# Patient Record
Sex: MALE | Race: WHITE | NOT HISPANIC OR LATINO | Employment: OTHER
[De-identification: names, ages, dates, MRNs, and addresses within clinical notes are randomized per-mention and may not be internally consistent; named-entity substitution may affect disease eponyms.]

---

## 2017-04-20 ENCOUNTER — PRIOR ORIGINAL RECORDS (OUTPATIENT)
Dept: OTHER | Age: 72
End: 2017-04-20

## 2017-04-20 LAB
ALBUMIN: 3.8 G/DL
ALKALINE PHOSPHATATE(ALK PHOS): 60 IU/L
BILIRUBIN TOTAL: 0.8 MG/DL
BUN: 14 MG/DL
CALCIUM: 8.9 MG/DL
CHLORIDE: 108 MEQ/L
CREATININE, SERUM: 0.9 MG/DL
FREE T4: 0.87 MG/DL
GLUCOSE: 117 MG/DL
HEMATOCRIT: 46.2 %
HEMOGLOBIN: 15.2 G/DL
PLATELETS: 177 K/UL
POTASSIUM, SERUM: 4.5 MEQ/L
PROTEIN, TOTAL: 7 G/DL
RED BLOOD COUNT: 5 X 10-6/U
SGOT (AST): 19 IU/L
SGPT (ALT): 20 IU/L
SODIUM: 142 MEQ/L
THYROID STIMULATING HORMONE: 2 MLU/L
VITAMIN D 25-OH: 27.9 NG/ML
WHITE BLOOD COUNT: 5.6 X 10-3/U

## 2017-05-01 LAB
ALT (SGPT): 20 U/L
AST (SGOT): 19 U/L
CHOLESTEROL, TOTAL: 99 MG/DL
GLUCOSE: 117 MG/DL
HDL CHOLESTEROL: 39 MG/DL
HEMOGLOBIN A1C: 6.2 %
LDL CHOLESTEROL: 42 MG/DL
THYROID STIMULATING HORMONE: 2 MLU/L
TRIGLYCERIDES: 92 MG/DL

## 2017-05-05 ENCOUNTER — PRIOR ORIGINAL RECORDS (OUTPATIENT)
Dept: OTHER | Age: 72
End: 2017-05-05

## 2017-06-19 ENCOUNTER — RT VISIT (OUTPATIENT)
Dept: RESPIRATORY THERAPY | Facility: HOSPITAL | Age: 72
End: 2017-06-19
Attending: INTERNAL MEDICINE
Payer: MEDICARE

## 2017-06-19 DIAGNOSIS — R05.9 COUGH: ICD-10-CM

## 2017-06-19 PROCEDURE — 94726 PLETHYSMOGRAPHY LUNG VOLUMES: CPT

## 2017-06-19 PROCEDURE — 94729 DIFFUSING CAPACITY: CPT

## 2017-06-19 PROCEDURE — 94060 EVALUATION OF WHEEZING: CPT

## 2017-06-20 NOTE — PROCEDURES
Spirometry and  flow volume loop are consistent with  mild  airway obstruction. Good respond to bronchodilators   ( The FEV1 improved by 280 ml after albuterol was given (a 12% improvement).    Normal TLC, no evidence of restriction  There is mild increa

## 2017-10-02 ENCOUNTER — HOSPITAL ENCOUNTER (OUTPATIENT)
Dept: CV DIAGNOSTICS | Facility: HOSPITAL | Age: 72
Discharge: HOME | End: 2017-10-02
Attending: INTERNAL MEDICINE
Payer: MEDICARE

## 2017-10-02 ENCOUNTER — HOSPITAL ENCOUNTER (OUTPATIENT)
Dept: LAB | Facility: HOSPITAL | Age: 72
Discharge: HOME | End: 2017-10-02
Attending: INTERNAL MEDICINE
Payer: MEDICARE

## 2017-10-02 ENCOUNTER — PRIOR ORIGINAL RECORDS (OUTPATIENT)
Dept: OTHER | Age: 72
End: 2017-10-02

## 2017-10-02 DIAGNOSIS — I25.10 CAD (CORONARY ARTERY DISEASE): ICD-10-CM

## 2017-10-02 PROCEDURE — 78452 HT MUSCLE IMAGE SPECT MULT: CPT | Performed by: INTERNAL MEDICINE

## 2017-10-02 PROCEDURE — 84460 ALANINE AMINO (ALT) (SGPT): CPT | Performed by: INTERNAL MEDICINE

## 2017-10-02 PROCEDURE — 84450 TRANSFERASE (AST) (SGOT): CPT | Performed by: INTERNAL MEDICINE

## 2017-10-02 PROCEDURE — 93017 CV STRESS TEST TRACING ONLY: CPT | Performed by: INTERNAL MEDICINE

## 2017-10-02 PROCEDURE — 80061 LIPID PANEL: CPT | Performed by: INTERNAL MEDICINE

## 2017-10-02 PROCEDURE — 83036 HEMOGLOBIN GLYCOSYLATED A1C: CPT | Performed by: INTERNAL MEDICINE

## 2017-10-02 PROCEDURE — 36415 COLL VENOUS BLD VENIPUNCTURE: CPT | Performed by: INTERNAL MEDICINE

## 2017-10-02 PROCEDURE — 93018 CV STRESS TEST I&R ONLY: CPT | Performed by: INTERNAL MEDICINE

## 2017-10-03 ENCOUNTER — PRIOR ORIGINAL RECORDS (OUTPATIENT)
Dept: OTHER | Age: 72
End: 2017-10-03

## 2017-10-03 LAB
ALT (SGPT): 22 U/L
AST (SGOT): 15 U/L
CHOLESTEROL, TOTAL: 103 MG/DL
HDL CHOLESTEROL: 53 MG/DL
HEMOGLOBIN A1C: 6.4 %
LDL CHOLESTEROL: 30 MG/DL
TRIGLYCERIDES: 98 MG/DL

## 2018-05-04 ENCOUNTER — MYAURORA ACCOUNT LINK (OUTPATIENT)
Dept: OTHER | Age: 73
End: 2018-05-04

## 2018-05-04 ENCOUNTER — PRIOR ORIGINAL RECORDS (OUTPATIENT)
Dept: OTHER | Age: 73
End: 2018-05-04

## 2018-05-21 ENCOUNTER — HOSPITAL ENCOUNTER (OUTPATIENT)
Dept: CARDIOLOGY CLINIC | Facility: HOSPITAL | Age: 73
Discharge: HOME | End: 2018-05-21
Attending: INTERNAL MEDICINE

## 2018-05-21 ENCOUNTER — MYAURORA ACCOUNT LINK (OUTPATIENT)
Dept: OTHER | Age: 73
End: 2018-05-21

## 2018-05-21 DIAGNOSIS — I73.9 CLAUDICATION (HCC): ICD-10-CM

## 2018-05-29 ENCOUNTER — PRIOR ORIGINAL RECORDS (OUTPATIENT)
Dept: OTHER | Age: 73
End: 2018-05-29

## 2018-06-01 ENCOUNTER — PRIOR ORIGINAL RECORDS (OUTPATIENT)
Dept: OTHER | Age: 73
End: 2018-06-01

## 2018-06-06 ENCOUNTER — PRIOR ORIGINAL RECORDS (OUTPATIENT)
Dept: OTHER | Age: 73
End: 2018-06-06

## 2018-11-02 ENCOUNTER — PRIOR ORIGINAL RECORDS (OUTPATIENT)
Dept: OTHER | Age: 73
End: 2018-11-02

## 2018-11-06 LAB
ALKALINE PHOSPHATATE(ALK PHOS): 74 IU/L
BILIRUBIN TOTAL: 0.7 MG/DL
BUN: 13 MG/DL
CALCIUM: 9.1 MG/DL
CHLORIDE: 105 MEQ/L
CHOLESTEROL, TOTAL: 109 MG/DL
CREATININE, SERUM: 0.74 MG/DL
GLUCOSE: 118 MG/DL
HDL CHOLESTEROL: 46 MG/DL
HEMOGLOBIN A1C: 6.7 %
LDL CHOLESTEROL: 45 MG/DL
POTASSIUM, SERUM: 4.7 MEQ/L
PROTEIN, TOTAL: 7.1 G/DL
SGOT (AST): 25 IU/L
SGPT (ALT): 24 IU/L
SODIUM: 142 MEQ/L
TRIGLYCERIDES: 87 MG/DL

## 2019-02-28 VITALS
BODY MASS INDEX: 32.14 KG/M2 | WEIGHT: 217 LBS | SYSTOLIC BLOOD PRESSURE: 112 MMHG | HEIGHT: 69 IN | DIASTOLIC BLOOD PRESSURE: 62 MMHG | HEART RATE: 96 BPM

## 2019-03-01 VITALS
HEART RATE: 72 BPM | WEIGHT: 224 LBS | BODY MASS INDEX: 33.18 KG/M2 | DIASTOLIC BLOOD PRESSURE: 68 MMHG | HEIGHT: 69 IN | SYSTOLIC BLOOD PRESSURE: 122 MMHG

## 2019-03-26 ENCOUNTER — TELEPHONE (OUTPATIENT)
Dept: CARDIOLOGY | Age: 74
End: 2019-03-26

## 2019-03-26 DIAGNOSIS — E78.00 HYPERCHOLESTEREMIA: Primary | ICD-10-CM

## 2019-03-26 RX ORDER — TAMSULOSIN HYDROCHLORIDE 0.4 MG/1
CAPSULE ORAL
COMMUNITY
Start: 2015-03-07

## 2019-03-26 RX ORDER — ACETAMINOPHEN 160 MG
TABLET,DISINTEGRATING ORAL
COMMUNITY
Start: 2018-05-04 | End: 2020-05-15 | Stop reason: ALTCHOICE

## 2019-03-26 RX ORDER — RAMIPRIL 5 MG/1
1 CAPSULE ORAL DAILY
COMMUNITY
Start: 2018-09-14 | End: 2019-11-05 | Stop reason: SDUPTHER

## 2019-03-26 RX ORDER — MELOXICAM 15 MG/1
1 TABLET ORAL DAILY
COMMUNITY
Start: 2018-05-04 | End: 2020-05-15 | Stop reason: ALTCHOICE

## 2019-03-26 RX ORDER — AMLODIPINE BESYLATE AND ATORVASTATIN CALCIUM 5; 20 MG/1; MG/1
1 TABLET, FILM COATED ORAL DAILY
COMMUNITY
Start: 2018-06-25 | End: 2019-09-03 | Stop reason: SDUPTHER

## 2019-03-26 RX ORDER — TIZANIDINE 2 MG/1
TABLET ORAL
COMMUNITY
End: 2020-05-15 | Stop reason: ALTCHOICE

## 2019-04-10 LAB
EST. AVERAGE GLUCOSE BLD GHB EST-MCNC: NORMAL MG/DL
HBA1C MFR BLD: 6.3 %
HBA1C MFR BLD: NORMAL % (ref 4.5–5.6)

## 2019-05-15 ENCOUNTER — OFFICE VISIT (OUTPATIENT)
Dept: CARDIOLOGY | Age: 74
End: 2019-05-15

## 2019-05-15 VITALS
HEART RATE: 84 BPM | BODY MASS INDEX: 32.14 KG/M2 | WEIGHT: 217 LBS | SYSTOLIC BLOOD PRESSURE: 116 MMHG | HEIGHT: 69 IN | DIASTOLIC BLOOD PRESSURE: 68 MMHG

## 2019-05-15 DIAGNOSIS — I73.9 PAD (PERIPHERAL ARTERY DISEASE) (CMD): ICD-10-CM

## 2019-05-15 DIAGNOSIS — E78.00 PURE HYPERCHOLESTEROLEMIA: ICD-10-CM

## 2019-05-15 DIAGNOSIS — I10 HYPERTENSION, BENIGN: Primary | ICD-10-CM

## 2019-05-15 PROCEDURE — 99214 OFFICE O/P EST MOD 30 MIN: CPT | Performed by: INTERNAL MEDICINE

## 2019-05-15 SDOH — HEALTH STABILITY: PHYSICAL HEALTH: ON AVERAGE, HOW MANY DAYS PER WEEK DO YOU ENGAGE IN MODERATE TO STRENUOUS EXERCISE (LIKE A BRISK WALK)?: 7 DAYS

## 2019-05-15 ASSESSMENT — ENCOUNTER SYMPTOMS
COUGH: 0
FEVER: 0
WEIGHT LOSS: 0
HEMOPTYSIS: 0
WEIGHT GAIN: 0
HEMATOCHEZIA: 0
BRUISES/BLEEDS EASILY: 0
SUSPICIOUS LESIONS: 0
CHILLS: 0
ALLERGIC/IMMUNOLOGIC COMMENTS: NO NEW FOOD ALLERGIES

## 2019-05-23 ENCOUNTER — CLINICAL ABSTRACT (OUTPATIENT)
Dept: CARDIOLOGY | Age: 74
End: 2019-05-23

## 2019-09-04 RX ORDER — AMLODIPINE BESYLATE AND ATORVASTATIN CALCIUM 5; 20 MG/1; MG/1
TABLET, FILM COATED ORAL
Qty: 90 TABLET | Refills: 2 | Status: SHIPPED | OUTPATIENT
Start: 2019-09-04 | End: 2020-04-14

## 2019-11-05 RX ORDER — RAMIPRIL 5 MG/1
CAPSULE ORAL
Qty: 90 CAPSULE | Refills: 3 | Status: SHIPPED | OUTPATIENT
Start: 2019-11-05 | End: 2019-12-16 | Stop reason: SDUPTHER

## 2019-11-05 RX ORDER — RAMIPRIL 5 MG/1
CAPSULE ORAL
Qty: 90 CAPSULE | Refills: 1 | OUTPATIENT
Start: 2019-11-05

## 2019-12-16 ENCOUNTER — TELEPHONE (OUTPATIENT)
Dept: CARDIOLOGY | Age: 74
End: 2019-12-16

## 2019-12-16 DIAGNOSIS — R07.2 PRECORDIAL PAIN: Primary | ICD-10-CM

## 2019-12-16 DIAGNOSIS — E78.00 PURE HYPERCHOLESTEROLEMIA: ICD-10-CM

## 2019-12-16 DIAGNOSIS — I73.9 PAD (PERIPHERAL ARTERY DISEASE) (CMD): ICD-10-CM

## 2019-12-16 DIAGNOSIS — I10 HYPERTENSION, BENIGN: ICD-10-CM

## 2019-12-16 RX ORDER — RAMIPRIL 5 MG/1
CAPSULE ORAL
Qty: 90 CAPSULE | Refills: 0 | Status: SHIPPED | OUTPATIENT
Start: 2019-12-16 | End: 2020-04-14

## 2020-03-24 ENCOUNTER — TELEPHONE (OUTPATIENT)
Dept: CARDIOLOGY | Age: 75
End: 2020-03-24

## 2020-03-24 DIAGNOSIS — I73.9 PAD (PERIPHERAL ARTERY DISEASE) (CMD): ICD-10-CM

## 2020-03-24 DIAGNOSIS — E78.00 PURE HYPERCHOLESTEROLEMIA: Primary | ICD-10-CM

## 2020-04-14 DIAGNOSIS — I10 HYPERTENSION, BENIGN: Primary | ICD-10-CM

## 2020-04-14 RX ORDER — AMLODIPINE BESYLATE AND ATORVASTATIN CALCIUM 5; 20 MG/1; MG/1
TABLET, FILM COATED ORAL
Qty: 90 TABLET | Refills: 3 | Status: SHIPPED | OUTPATIENT
Start: 2020-04-14 | End: 2021-04-15

## 2020-04-14 RX ORDER — RAMIPRIL 5 MG/1
CAPSULE ORAL
Qty: 90 CAPSULE | Refills: 3 | Status: SHIPPED | OUTPATIENT
Start: 2020-04-14 | End: 2021-01-14 | Stop reason: SDUPTHER

## 2020-04-29 LAB
ALBUMIN SERPL-MCNC: 4 G/DL
ALP SERPL-CCNC: 69 U/L
ALT SERPL-CCNC: 17 U/L
ANION GAP SERPL CALC-SCNC: 13 MMOL/L
APPEARANCE UR: CLEAR
AST SERPL-CCNC: 16 U/L
BASOPHILS # BLD: 0 10*3/UL
BASOPHILS NFR BLD: 0.3 %
BILIRUB SERPL-MCNC: 0.7 MG/DL
BILIRUB UR QL: NEGATIVE
BUN SERPL-MCNC: 13 MG/DL
CALCIUM SERPL-MCNC: 9.1 MG/DL
CHLORIDE SERPL-SCNC: 104 MMOL/L
CHOLEST SERPL-MCNC: 98 MG/DL
CO2 SERPL-SCNC: 24 MMOL/L
COLOR UR: YELLOW
CREAT SERPL-MCNC: 1 MG/DL
EOSINOPHIL # BLD: 0.1 10*3/UL
EOSINOPHIL NFR BLD: 1.9 %
ERYTHROCYTE [DISTWIDTH] IN BLOOD: 13.3 %
GLUCOSE SERPL-MCNC: 120 MG/DL
GLUCOSE UR-MCNC: NEGATIVE MG/DL
HBA1C MFR BLD: 6.3 %
HCT VFR BLD CALC: 46.6 %
HDLC SERPL-MCNC: 39 MG/DL
HGB BLD-MCNC: 16 G/DL
HGB UR QL: NORMAL
KETONES UR-MCNC: NEGATIVE MG/DL
LDLC SERPL CALC-MCNC: 31 MG/DL
LENGTH OF FAST TIME PATIENT: YES H
LENGTH OF FAST TIME PATIENT: YES H
LEUKOCYTE ESTERASE UR QL STRIP: NEGATIVE
LYMPHOCYTES # BLD: 2 10*3/UL
LYMPHOCYTES NFR BLD: 35.8 %
MCH RBC QN AUTO: 31.9 PG
MCHC RBC AUTO-ENTMCNC: 34.3 G/DL
MCV RBC AUTO: 93.1 FL
MONOCYTES # BLD: 0.7 10*3/UL
MONOCYTES NFR BLD: 12 %
NEUTROPHILS # BLD: 2.8 10*3/UL
NEUTROPHILS NFR BLD: 50 %
NITRITE UR QL: NEGATIVE
PH UR: 6 [PH]
PLATELET # BLD: 205 10*3/UL
POTASSIUM SERPL-SCNC: 4.6 MMOL/L
PROT SERPL-MCNC: 7.1 G/DL
PROT UR QL: NEGATIVE
PSA SERPL-MCNC: 1.5 NG/ML
RBC # BLD: 5.01 10*6/UL
SODIUM SERPL-SCNC: 141 MMOL/L
SP GR UR: 1.02
SPECIMEN SOURCE: NORMAL
TRIGL SERPL-MCNC: 142 MG/DL
TSH SERPL-ACNC: 1.85 M[IU]/L
UROBILINOGEN UR QL: <2
WBC # BLD: 5.6 10*3/UL

## 2020-05-01 ENCOUNTER — TELEPHONE (OUTPATIENT)
Dept: CARDIOLOGY | Age: 75
End: 2020-05-01

## 2020-05-01 ENCOUNTER — CLINICAL ABSTRACT (OUTPATIENT)
Dept: CARDIOLOGY | Age: 75
End: 2020-05-01

## 2020-05-15 ENCOUNTER — OFFICE VISIT (OUTPATIENT)
Dept: CARDIOLOGY | Age: 75
End: 2020-05-15

## 2020-05-15 VITALS
BODY MASS INDEX: 31.4 KG/M2 | DIASTOLIC BLOOD PRESSURE: 66 MMHG | HEART RATE: 80 BPM | WEIGHT: 212 LBS | HEIGHT: 69 IN | SYSTOLIC BLOOD PRESSURE: 123 MMHG

## 2020-05-15 DIAGNOSIS — E78.00 PURE HYPERCHOLESTEROLEMIA: ICD-10-CM

## 2020-05-15 DIAGNOSIS — I25.10 CORONARY ARTERY DISEASE INVOLVING NATIVE CORONARY ARTERY OF NATIVE HEART WITHOUT ANGINA PECTORIS: ICD-10-CM

## 2020-05-15 DIAGNOSIS — I10 HYPERTENSION, BENIGN: Primary | ICD-10-CM

## 2020-05-15 DIAGNOSIS — I73.9 PAD (PERIPHERAL ARTERY DISEASE) (CMD): ICD-10-CM

## 2020-05-15 PROCEDURE — 99442 TELEPHONE E&M BY PHYSICIAN EST PT NOT ORIG PREV 7 DAYS 11-20 MIN: CPT | Performed by: INTERNAL MEDICINE

## 2020-05-15 RX ORDER — CARVEDILOL 25 MG/1
TABLET, FILM COATED ORAL
COMMUNITY
Start: 2020-04-21

## 2020-05-15 RX ORDER — LANCETS
EACH MISCELLANEOUS
COMMUNITY
Start: 2020-03-25

## 2020-05-15 SDOH — HEALTH STABILITY: PHYSICAL HEALTH: ON AVERAGE, HOW MANY DAYS PER WEEK DO YOU ENGAGE IN MODERATE TO STRENUOUS EXERCISE (LIKE A BRISK WALK)?: 0 DAYS

## 2020-05-15 SDOH — SOCIAL STABILITY: SOCIAL NETWORK: ARE YOU MARRIED, WIDOWED, DIVORCED, SEPARATED, NEVER MARRIED, OR LIVING WITH A PARTNER?: MARRIED

## 2020-05-15 SDOH — HEALTH STABILITY: PHYSICAL HEALTH: ON AVERAGE, HOW MANY MINUTES DO YOU ENGAGE IN EXERCISE AT THIS LEVEL?: 0 MIN

## 2020-05-15 ASSESSMENT — PATIENT HEALTH QUESTIONNAIRE - PHQ9
1. LITTLE INTEREST OR PLEASURE IN DOING THINGS: NOT AT ALL
SUM OF ALL RESPONSES TO PHQ9 QUESTIONS 1 AND 2: 0
SUM OF ALL RESPONSES TO PHQ9 QUESTIONS 1 AND 2: 0
2. FEELING DOWN, DEPRESSED OR HOPELESS: NOT AT ALL

## 2021-01-14 ENCOUNTER — TELEPHONE (OUTPATIENT)
Dept: CARDIOLOGY | Age: 76
End: 2021-01-14

## 2021-01-14 DIAGNOSIS — I10 HYPERTENSION, BENIGN: ICD-10-CM

## 2021-01-14 RX ORDER — RAMIPRIL 5 MG/1
5 CAPSULE ORAL DAILY
Qty: 90 CAPSULE | Refills: 3 | Status: SHIPPED | OUTPATIENT
Start: 2021-01-14

## 2021-04-08 ENCOUNTER — TELEPHONE (OUTPATIENT)
Dept: CARDIOLOGY | Age: 76
End: 2021-04-08

## 2021-04-08 DIAGNOSIS — I25.10 CORONARY ARTERY DISEASE INVOLVING NATIVE CORONARY ARTERY OF NATIVE HEART WITHOUT ANGINA PECTORIS: Primary | ICD-10-CM

## 2021-04-08 DIAGNOSIS — E78.00 PURE HYPERCHOLESTEROLEMIA: ICD-10-CM

## 2021-04-14 DIAGNOSIS — I10 HYPERTENSION, BENIGN: ICD-10-CM

## 2021-04-15 RX ORDER — AMLODIPINE BESYLATE AND ATORVASTATIN CALCIUM 5; 20 MG/1; MG/1
TABLET, FILM COATED ORAL
Qty: 90 TABLET | Refills: 0 | Status: SHIPPED | OUTPATIENT
Start: 2021-04-15

## 2021-05-07 ENCOUNTER — OFFICE VISIT (OUTPATIENT)
Dept: CARDIOLOGY | Age: 76
End: 2021-05-07

## 2021-05-07 VITALS
SYSTOLIC BLOOD PRESSURE: 120 MMHG | DIASTOLIC BLOOD PRESSURE: 62 MMHG | HEART RATE: 88 BPM | HEIGHT: 69 IN | WEIGHT: 215 LBS | BODY MASS INDEX: 31.84 KG/M2

## 2021-05-07 DIAGNOSIS — E78.00 PURE HYPERCHOLESTEROLEMIA: ICD-10-CM

## 2021-05-07 DIAGNOSIS — I73.9 PAD (PERIPHERAL ARTERY DISEASE) (CMD): Primary | ICD-10-CM

## 2021-05-07 DIAGNOSIS — I25.10 CORONARY ARTERY DISEASE INVOLVING NATIVE CORONARY ARTERY OF NATIVE HEART WITHOUT ANGINA PECTORIS: ICD-10-CM

## 2021-05-07 DIAGNOSIS — I10 HYPERTENSION, BENIGN: ICD-10-CM

## 2021-05-07 PROCEDURE — 99214 OFFICE O/P EST MOD 30 MIN: CPT | Performed by: INTERNAL MEDICINE

## 2021-05-07 RX ORDER — BACLOFEN 10 MG/1
TABLET ORAL
COMMUNITY
Start: 2021-01-13

## 2021-05-07 ASSESSMENT — ENCOUNTER SYMPTOMS
COUGH: 0
WEIGHT LOSS: 0
BRUISES/BLEEDS EASILY: 0
ALLERGIC/IMMUNOLOGIC COMMENTS: NO NEW FOOD ALLERGIES
WEIGHT GAIN: 0
SUSPICIOUS LESIONS: 0
HEMATOCHEZIA: 0
FEVER: 0
HEMOPTYSIS: 0
CHILLS: 0

## 2021-05-07 ASSESSMENT — PATIENT HEALTH QUESTIONNAIRE - PHQ9
1. LITTLE INTEREST OR PLEASURE IN DOING THINGS: NOT AT ALL
CLINICAL INTERPRETATION OF PHQ2 SCORE: NO FURTHER SCREENING NEEDED
CLINICAL INTERPRETATION OF PHQ9 SCORE: NO FURTHER SCREENING NEEDED
SUM OF ALL RESPONSES TO PHQ9 QUESTIONS 1 AND 2: 0
SUM OF ALL RESPONSES TO PHQ9 QUESTIONS 1 AND 2: 0
2. FEELING DOWN, DEPRESSED OR HOPELESS: NOT AT ALL

## 2025-04-25 ENCOUNTER — TELEPHONE (OUTPATIENT)
Dept: FAMILY MEDICINE CLINIC | Facility: CLINIC | Age: 80
End: 2025-04-25

## 2025-04-25 NOTE — TELEPHONE ENCOUNTER
MAREK on 05/09/25 with Dr. Behery @ [facility]    H&P- completed 04/29/25  Labs- FBS (116), A1C (5.9), MRSA neg, all other labs WNL  EKG- WNL  X-ray- no cardiopulmonary process    Cardiology- Dr. Paras Mckinney  P- [phone]  F- [phone]  Last seen: 04/23/25  Patient doing well overall. He has a history of PAD but no major CAD. He does not have angina. He has relatively good exercise tolerance despite his knee. EKG is normal. Given this, I think his risks can be acceptable although elevated given his comorbidities.As it has been quite a few years since his last stress test, I did offer him a Lexiscan. Patient feels he is doing okay and does not want to do 1.I did order an echo with Dopplers to make sure his LV function is normal. If it is indeed normal, he should be at acceptable risk.    Cardiac Clx scanned into Media 05/01/25:  \"Acceptable cardiac risk for knee surgery. Normal EF with no wall motion abnormalities or valve disease.\"    Echo scanned into Media 05/01/25

## 2025-04-29 ENCOUNTER — OFFICE VISIT (OUTPATIENT)
Dept: FAMILY MEDICINE CLINIC | Facility: CLINIC | Age: 80
End: 2025-04-29
Payer: MEDICARE

## 2025-04-29 ENCOUNTER — LAB ENCOUNTER (OUTPATIENT)
Dept: LAB | Facility: HOSPITAL | Age: 80
End: 2025-04-29
Attending: FAMILY MEDICINE
Payer: MEDICARE

## 2025-04-29 ENCOUNTER — HOSPITAL ENCOUNTER (OUTPATIENT)
Dept: GENERAL RADIOLOGY | Facility: HOSPITAL | Age: 80
Discharge: HOME | End: 2025-04-29
Attending: FAMILY MEDICINE
Payer: MEDICARE

## 2025-04-29 VITALS
HEART RATE: 90 BPM | BODY MASS INDEX: 31.71 KG/M2 | RESPIRATION RATE: 16 BRPM | OXYGEN SATURATION: 93 % | DIASTOLIC BLOOD PRESSURE: 70 MMHG | WEIGHT: 202 LBS | SYSTOLIC BLOOD PRESSURE: 116 MMHG | TEMPERATURE: 98 F | HEIGHT: 67 IN

## 2025-04-29 DIAGNOSIS — Z86.0100 HISTORY OF COLONIC POLYPS: ICD-10-CM

## 2025-04-29 DIAGNOSIS — R06.02 SHORTNESS OF BREATH: ICD-10-CM

## 2025-04-29 DIAGNOSIS — N40.1 BENIGN PROSTATIC HYPERPLASIA WITH NOCTURIA: ICD-10-CM

## 2025-04-29 DIAGNOSIS — E11.9 TYPE 2 DIABETES MELLITUS WITHOUT COMPLICATION, WITHOUT LONG-TERM CURRENT USE OF INSULIN (HCC): ICD-10-CM

## 2025-04-29 DIAGNOSIS — Z01.812 PRE-OPERATIVE LABORATORY EXAMINATION: ICD-10-CM

## 2025-04-29 DIAGNOSIS — I25.10 ATHEROSCLEROSIS OF NATIVE CORONARY ARTERY OF NATIVE HEART WITHOUT ANGINA PECTORIS: ICD-10-CM

## 2025-04-29 DIAGNOSIS — Z01.818 PREOP EXAMINATION: ICD-10-CM

## 2025-04-29 DIAGNOSIS — I10 PRIMARY HYPERTENSION: ICD-10-CM

## 2025-04-29 DIAGNOSIS — Z85.828 HISTORY OF SKIN CANCER IN ADULTHOOD: ICD-10-CM

## 2025-04-29 DIAGNOSIS — R35.1 BENIGN PROSTATIC HYPERPLASIA WITH NOCTURIA: ICD-10-CM

## 2025-04-29 DIAGNOSIS — M17.12 PRIMARY OSTEOARTHRITIS OF LEFT KNEE: Primary | ICD-10-CM

## 2025-04-29 DIAGNOSIS — M47.16 LUMBAR SPONDYLOSIS WITH MYELOPATHY: ICD-10-CM

## 2025-04-29 DIAGNOSIS — M15.0 PRIMARY OSTEOARTHRITIS INVOLVING MULTIPLE JOINTS: ICD-10-CM

## 2025-04-29 DIAGNOSIS — K59.01 SLOW TRANSIT CONSTIPATION: ICD-10-CM

## 2025-04-29 DIAGNOSIS — E55.9 VITAMIN D DEFICIENCY: ICD-10-CM

## 2025-04-29 DIAGNOSIS — E78.2 MIXED HYPERLIPIDEMIA: ICD-10-CM

## 2025-04-29 LAB
ALBUMIN SERPL-MCNC: 4.4 G/DL (ref 3.2–4.8)
ALBUMIN/GLOB SERPL: 1.8 {RATIO} (ref 1–2)
ALP LIVER SERPL-CCNC: 75 U/L (ref 45–117)
ALT SERPL-CCNC: 14 U/L (ref 10–49)
ANION GAP SERPL CALC-SCNC: 6 MMOL/L (ref 0–18)
APTT PPP: 28.8 SECONDS (ref 23–36)
AST SERPL-CCNC: 18 U/L (ref ?–34)
ATRIAL RATE: 79 BPM
BASOPHILS # BLD AUTO: 0.03 X10(3) UL (ref 0–0.2)
BASOPHILS NFR BLD AUTO: 0.6 %
BILIRUB SERPL-MCNC: 0.8 MG/DL (ref 0.2–1.1)
BILIRUB UR QL: NEGATIVE
BUN BLD-MCNC: 16 MG/DL (ref 9–23)
BUN/CREAT SERPL: 16.5 (ref 10–20)
CALCIUM BLD-MCNC: 8.9 MG/DL (ref 8.7–10.4)
CHLORIDE SERPL-SCNC: 104 MMOL/L (ref 98–112)
CLARITY UR: CLEAR
CO2 SERPL-SCNC: 29 MMOL/L (ref 21–32)
CREAT BLD-MCNC: 0.97 MG/DL (ref 0.7–1.3)
DEPRECATED RDW RBC AUTO: 45.4 FL (ref 35.1–46.3)
EGFRCR SERPLBLD CKD-EPI 2021: 78 ML/MIN/1.73M2 (ref 60–?)
EOSINOPHIL # BLD AUTO: 0.14 X10(3) UL (ref 0–0.7)
EOSINOPHIL NFR BLD AUTO: 2.8 %
ERYTHROCYTE [DISTWIDTH] IN BLOOD BY AUTOMATED COUNT: 12.6 % (ref 11–15)
EST. AVERAGE GLUCOSE BLD GHB EST-MCNC: 123 MG/DL (ref 68–126)
FASTING STATUS PATIENT QL REPORTED: YES
GLOBULIN PLAS-MCNC: 2.5 G/DL (ref 2–3.5)
GLUCOSE BLD-MCNC: 116 MG/DL (ref 70–99)
GLUCOSE UR-MCNC: NORMAL MG/DL
HBA1C MFR BLD: 5.9 % (ref ?–5.7)
HCT VFR BLD AUTO: 45.3 % (ref 39–53)
HGB BLD-MCNC: 15.1 G/DL (ref 13–17.5)
HGB UR QL STRIP.AUTO: NEGATIVE
IMM GRANULOCYTES # BLD AUTO: 0.01 X10(3) UL (ref 0–1)
IMM GRANULOCYTES NFR BLD: 0.2 %
INR BLD: 0.96 (ref 0.8–1.2)
KETONES UR-MCNC: NEGATIVE MG/DL
LEUKOCYTE ESTERASE UR QL STRIP.AUTO: NEGATIVE
LYMPHOCYTES # BLD AUTO: 1.88 X10(3) UL (ref 1–4)
LYMPHOCYTES NFR BLD AUTO: 37.5 %
MCH RBC QN AUTO: 31.9 PG (ref 26–34)
MCHC RBC AUTO-ENTMCNC: 33.3 G/DL (ref 31–37)
MCV RBC AUTO: 95.8 FL (ref 80–100)
MONOCYTES # BLD AUTO: 0.55 X10(3) UL (ref 0.1–1)
MONOCYTES NFR BLD AUTO: 11 %
NEUTROPHILS # BLD AUTO: 2.4 X10 (3) UL (ref 1.5–7.7)
NEUTROPHILS # BLD AUTO: 2.4 X10(3) UL (ref 1.5–7.7)
NEUTROPHILS NFR BLD AUTO: 47.9 %
NITRITE UR QL STRIP.AUTO: NEGATIVE
OSMOLALITY SERPL CALC.SUM OF ELEC: 290 MOSM/KG (ref 275–295)
P AXIS: 67 DEGREES
P-R INTERVAL: 190 MS
PH UR: 5 [PH] (ref 5–8)
PLATELET # BLD AUTO: 187 10(3)UL (ref 150–450)
POTASSIUM SERPL-SCNC: 4.4 MMOL/L (ref 3.5–5.1)
PROT SERPL-MCNC: 6.9 G/DL (ref 5.7–8.2)
PROT UR-MCNC: NEGATIVE MG/DL
PROTHROMBIN TIME: 13.4 SECONDS (ref 11.6–14.8)
Q-T INTERVAL: 376 MS
QRS DURATION: 80 MS
QTC CALCULATION (BEZET): 431 MS
R AXIS: 14 DEGREES
RBC # BLD AUTO: 4.73 X10(6)UL (ref 3.8–5.8)
SODIUM SERPL-SCNC: 139 MMOL/L (ref 136–145)
SP GR UR STRIP: 1.02 (ref 1–1.03)
T AXIS: 55 DEGREES
UROBILINOGEN UR STRIP-ACNC: NORMAL
VENTRICULAR RATE: 79 BPM
WBC # BLD AUTO: 5 X10(3) UL (ref 4–11)

## 2025-04-29 PROCEDURE — 85730 THROMBOPLASTIN TIME PARTIAL: CPT

## 2025-04-29 PROCEDURE — 36415 COLL VENOUS BLD VENIPUNCTURE: CPT

## 2025-04-29 PROCEDURE — 87081 CULTURE SCREEN ONLY: CPT

## 2025-04-29 PROCEDURE — 85025 COMPLETE CBC W/AUTO DIFF WBC: CPT

## 2025-04-29 PROCEDURE — 93010 ELECTROCARDIOGRAM REPORT: CPT | Performed by: STUDENT IN AN ORGANIZED HEALTH CARE EDUCATION/TRAINING PROGRAM

## 2025-04-29 PROCEDURE — 80053 COMPREHEN METABOLIC PANEL: CPT

## 2025-04-29 PROCEDURE — 71046 X-RAY EXAM CHEST 2 VIEWS: CPT | Performed by: FAMILY MEDICINE

## 2025-04-29 PROCEDURE — 83036 HEMOGLOBIN GLYCOSYLATED A1C: CPT

## 2025-04-29 PROCEDURE — 81003 URINALYSIS AUTO W/O SCOPE: CPT

## 2025-04-29 PROCEDURE — 99204 OFFICE O/P NEW MOD 45 MIN: CPT | Performed by: FAMILY MEDICINE

## 2025-04-29 PROCEDURE — 99499 UNLISTED E&M SERVICE: CPT | Performed by: FAMILY MEDICINE

## 2025-04-29 PROCEDURE — 93005 ELECTROCARDIOGRAM TRACING: CPT

## 2025-04-29 PROCEDURE — 85610 PROTHROMBIN TIME: CPT

## 2025-04-29 RX ORDER — ATORVASTATIN CALCIUM 20 MG/1
20 TABLET, FILM COATED ORAL DAILY
COMMUNITY

## 2025-04-29 RX ORDER — POLYETHYLENE GLYCOL 3350 17 G/17G
17 POWDER, FOR SOLUTION ORAL DAILY
COMMUNITY

## 2025-04-29 RX ORDER — TAMSULOSIN HYDROCHLORIDE 0.4 MG/1
0.4 CAPSULE ORAL DAILY
COMMUNITY

## 2025-04-29 RX ORDER — MULTIVITAMIN
1 TABLET ORAL DAILY
COMMUNITY

## 2025-04-29 RX ORDER — RAMIPRIL 10 MG/1
10 CAPSULE ORAL DAILY
COMMUNITY

## 2025-04-29 NOTE — H&P
[facility] PRE-OP CLINIC [place]    PRE-OP NOTE    HPI:   I have been consulted by Dr. Behery to see Fransisco David 80 year old male for a preoperative evaluation and medical clearance. He has a long history of worsening severe degenerative arthritis of his left knee . Patient is to have a left total knee arthroplasty  by Dr. Behery  on 2025.     Pt suffers significant pain and loss of function.     He has no cardiopulmonary symptoms.      He has no history of LINDY or DVT. Denies tobacco use.       Family History[1]   Current Medications[2]  Past Medical History[3]  Past Surgical History[4]  Social History     Socioeconomic History    Marital status:      Spouse name: Not on file    Number of children: Not on file    Years of education: Not on file    Highest education level: Not on file   Occupational History    Not on file   Tobacco Use    Smoking status: Former     Types: Cigarettes     Start date:      Quit date:      Years since quittin.3     Passive exposure: Past    Smokeless tobacco: Never   Vaping Use    Vaping status: Never Used   Substance and Sexual Activity    Alcohol use: Yes     Comment: socailly    Drug use: Never    Sexual activity: Not on file   Other Topics Concern    Not on file   Social History Narrative    Not on file     Social Drivers of Health     Food Insecurity: Not on file   Transportation Needs: Not on file   Stress: Not on file   Housing Stability: Not on file       REVIEW OF SYSTEMS:   CONSTITUTIONAL:  Denies unusual weight gain/loss, fever, chills  EENT:  Eyes:  Denies eye pain, visual loss, blurred vision, double vision. Ears, Nose, Throat:  Denies congestion, runny nose or sore throat.  INTEGUMENTARY:  Denies rashes, itching, skin lesion,   CARDIOVASCULAR:  Denies DVT. Denies chest pain, palpitations, edema, dyspnea  RESPIRATORY: Denies  LINDY ,Denies shortness of breath, wheezing, cough  GASTROINTESTINAL:  Denies abdominal pain, nausea, vomiting,  constipation, diarrhea, or blood in stool.  MUSCULOSKELETAL: severe pain to his left knee as noted above  NEUROLOGICAL:  Denies headache, seizures, dizziness, syncope, paralysis, ataxia,  HEMATOLOGIC:  Denies anemia, bleeding or bruising.  LYMPHATICS:  Denies enlarged nodes   PSYCHIATRIC:  Denies depression or anxiety.  ENDOCRINOLOGIC: DM 2 YES,   ALLERGIES:  Denies allergic response, history of asthma, hives,     EXAM:   /70 (BP Location: Left arm)   Pulse 90   Temp 98 °F (36.7 °C) (Temporal)   Resp 16   Ht 5' 7\" (1.702 m)   Wt 202 lb (91.6 kg)   SpO2 93%   BMI 31.64 kg/m²  Estimated body mass index is 31.64 kg/m² as calculated from the following:    Height as of this encounter: 5' 7\" (1.702 m).    Weight as of this encounter: 202 lb (91.6 kg).   Vital signs reviewed.Appears stated age, well groomed.  Physical Exam:  GEN:  Patient is alert, awake and oriented, well developed, well nourished, no apparent distress.  HEENT:  Head:  Normocephalic, atraumatic  Nose: patent, no nasal discharge  NECK: Supple, no CLAD, no carotid bruit, no thyromegaly.  SKIN: No rashes, no skin lesion, no bruising, good turgor.  HEART:  Regular rate and rhythm, no murmurs, rubs or gallops.  LUNGS: Clear to auscultation bilterally, no rales/rhonchi/wheezing.  CHEST: No tenderness.  ABDOMEN:  Soft, nondistended, nontender,  no masses, no hepatosplenomegaly.  BACK: No tenderness, no spasm,   EXTREMITIES:  hypertrophic changes to his left knee ,   NEURO:  No focal deficit, speech fluent, he walks with an antalgic  gait, strength and tone, sensory intact      Lab Results   Component Value Date    AST 15 10/02/2017    ALT 22 10/02/2017       No results found.          ASSESSMENT AND PLAN:   Fransisco David is a 80 year old male, with a hx of severe degenerative arthritis of his left knee  who presents for a pre-operative physical exam. Patient is to have a left total knee arthroplasty  by Dr. Behery  on 5/9/2025.      ICD-10-CM     1. Primary osteoarthritis of left knee  M17.12       2. Primary hypertension  I10       3. Mixed hyperlipidemia  E78.2       4. Slow transit constipation  K59.01       5. Benign prostatic hyperplasia with nocturia  N40.1     R35.1       6. Vitamin D deficiency  E55.9       7. Atherosclerosis of native coronary artery of native heart without angina pectoris  I25.10       8. History of colonic polyps  Z86.0100       9. Primary osteoarthritis involving multiple joints  M15.0     S/P right hip and right knee arthroplasty      10. History of skin cancer in adulthood  Z85.828       11. Lumbar spondylosis with myelopathy S/P multilevel laminectomy and discectomy  M47.16       12. BMI 31.0-31.9,adult  Z68.31       13. Type 2 diabetes mellitus without complication, without long-term current use of insulin (HCC)  E11.9 CBC With Differential With Platelet     Comp Metabolic Panel (14)     EKG 12 Lead     Hemoglobin A1C     MSSA and MRSA Culture Screen     XR CHEST PA + LAT CHEST (CPT=71046)     Urinalysis with Culture Reflex     PTT, Activated     Prothrombin Time (PT)      14. Shortness of breath  R06.02 CBC With Differential With Platelet     Comp Metabolic Panel (14)     EKG 12 Lead     Hemoglobin A1C     MSSA and MRSA Culture Screen     XR CHEST PA + LAT CHEST (CPT=71046)     Urinalysis with Culture Reflex     PTT, Activated     Prothrombin Time (PT)      15. Pre-operative laboratory examination  Z01.812 CBC With Differential With Platelet     Comp Metabolic Panel (14)     EKG 12 Lead     Hemoglobin A1C     MSSA and MRSA Culture Screen     XR CHEST PA + LAT CHEST (CPT=71046)     Urinalysis with Culture Reflex     PTT, Activated     Prothrombin Time (PT)      16. Preop examination  Z01.818 CBC With Differential With Platelet     Comp Metabolic Panel (14)     EKG 12 Lead     Hemoglobin A1C     MSSA and MRSA Culture Screen     XR CHEST PA + LAT CHEST (CPT=71046)     Urinalysis with Culture Reflex     PTT, Activated      Prothrombin Time (PT)        Problem List[5]     ECG and labs are pending review     Preoperative Risk Stratification: There are no decompensated medical conditions. ASA classification 2    Medical history:  High risk surgery (vascular, thoracic, intra-peritoneal): No  CAD: Yes  CHF: No  Stroke: No  DM on insulin: No  Serum Creatinine >2 mg/dl: No  Patient has an RCRI score that is intermediate risk and is at intermediate risk for major cardiac event in the perioperative period.     Patient is medically optimized and has an acceptable risk of surgery and may proceed with surgery as planned.     PLAN:    Patient to discontinue medications and supplements with anticoagulation properties as per instruction.        Postoperative Recommendations:    Anticoagulation / DVT prophylaxis: SCDs, early ambulation. ASA 81 mg twice daily with food for four weeks.    GI protection: Protonix  Incentive Spirometry   Telemetry as needed  CPAP/O2 as needed   DM: QID glucoscans and sliding scale insulin as needed   Renal protection: (hydration / NSAID and ACE/ARB avoidance)   Cognitive protection: (minimize narcotics, benzodiazepines, scopolamine)     Pain management and Physical therapy as per Orthopedic service.   Home Health as needed    Thank you for the opportunity to care for your patient and to assist in managing the postoperative course.        Johan Reid,   4/29/2025  9:26 AM         [1]   Family History  Problem Relation Age of Onset    Other (Other) Father         cirrohsis    Cancer Mother         liver    Other (Other) Brother         MI   [2]   Current Outpatient Medications   Medication Sig Dispense Refill    atorvastatin 20 MG Oral Tab Take 1 tablet (20 mg total) by mouth daily.      ramipril 10 MG Oral Cap Take 1 capsule (10 mg total) by mouth daily.      metFORMIN 500 MG Oral Tab Take 1 tablet (500 mg total) by mouth 2 (two) times daily with meals.      tamsulosin 0.4 MG Oral Cap Take 1 capsule (0.4 mg total) by  mouth daily.      polyethylene glycol, PEG 3350, 17 g Oral Powd Pack Take 17 g by mouth daily.      Multiple Vitamin (ONE-DAILY MULTI VITAMINS) Oral Tab Take 1 tablet by mouth daily.      Cholecalciferol (VITAMIN D3 OR) Take 1,000 Units by mouth daily.     [3]   Past Medical History:   CAD (coronary artery disease)    Cancer (HCC)    skin    Diabetes type 2 (HCC)    History of colon polyps    HTN (hypertension)    Hyperlipidemia    Osteoarthritis    Visual impairment    reading   [4]   Past Surgical History:  Procedure Laterality Date    Anesth,lower arm surgery Right     wrist surgery    Cataract Bilateral     Hip replacement surgery Right 2007    Skin surgery      skin cancer removed from face and chest    Total knee replacement Right 2014   [5] There is no problem list on file for this patient.

## 2025-04-29 NOTE — H&P (VIEW-ONLY)
[facility] PRE-OP CLINIC [place]    PRE-OP NOTE    HPI:   I have been consulted by Dr. Behery to see Fransisco David 80 year old male for a preoperative evaluation and medical clearance. He has a long history of worsening severe degenerative arthritis of his left knee . Patient is to have a left total knee arthroplasty  by Dr. Behery  on 2025.     Pt suffers significant pain and loss of function.     He has no cardiopulmonary symptoms.      He has no history of LINDY or DVT. Denies tobacco use.       Family History[1]   Current Medications[2]  Past Medical History[3]  Past Surgical History[4]  Social History     Socioeconomic History    Marital status:      Spouse name: Not on file    Number of children: Not on file    Years of education: Not on file    Highest education level: Not on file   Occupational History    Not on file   Tobacco Use    Smoking status: Former     Types: Cigarettes     Start date:      Quit date:      Years since quittin.3     Passive exposure: Past    Smokeless tobacco: Never   Vaping Use    Vaping status: Never Used   Substance and Sexual Activity    Alcohol use: Yes     Comment: socailly    Drug use: Never    Sexual activity: Not on file   Other Topics Concern    Not on file   Social History Narrative    Not on file     Social Drivers of Health     Food Insecurity: Not on file   Transportation Needs: Not on file   Stress: Not on file   Housing Stability: Not on file       REVIEW OF SYSTEMS:   CONSTITUTIONAL:  Denies unusual weight gain/loss, fever, chills  EENT:  Eyes:  Denies eye pain, visual loss, blurred vision, double vision. Ears, Nose, Throat:  Denies congestion, runny nose or sore throat.  INTEGUMENTARY:  Denies rashes, itching, skin lesion,   CARDIOVASCULAR:  Denies DVT. Denies chest pain, palpitations, edema, dyspnea  RESPIRATORY: Denies  LINDY ,Denies shortness of breath, wheezing, cough  GASTROINTESTINAL:  Denies abdominal pain, nausea, vomiting,  constipation, diarrhea, or blood in stool.  MUSCULOSKELETAL: severe pain to his left knee as noted above  NEUROLOGICAL:  Denies headache, seizures, dizziness, syncope, paralysis, ataxia,  HEMATOLOGIC:  Denies anemia, bleeding or bruising.  LYMPHATICS:  Denies enlarged nodes   PSYCHIATRIC:  Denies depression or anxiety.  ENDOCRINOLOGIC: DM 2 YES,   ALLERGIES:  Denies allergic response, history of asthma, hives,     EXAM:   /70 (BP Location: Left arm)   Pulse 90   Temp 98 °F (36.7 °C) (Temporal)   Resp 16   Ht 5' 7\" (1.702 m)   Wt 202 lb (91.6 kg)   SpO2 93%   BMI 31.64 kg/m²  Estimated body mass index is 31.64 kg/m² as calculated from the following:    Height as of this encounter: 5' 7\" (1.702 m).    Weight as of this encounter: 202 lb (91.6 kg).   Vital signs reviewed.Appears stated age, well groomed.  Physical Exam:  GEN:  Patient is alert, awake and oriented, well developed, well nourished, no apparent distress.  HEENT:  Head:  Normocephalic, atraumatic  Nose: patent, no nasal discharge  NECK: Supple, no CLAD, no carotid bruit, no thyromegaly.  SKIN: No rashes, no skin lesion, no bruising, good turgor.  HEART:  Regular rate and rhythm, no murmurs, rubs or gallops.  LUNGS: Clear to auscultation bilterally, no rales/rhonchi/wheezing.  CHEST: No tenderness.  ABDOMEN:  Soft, nondistended, nontender,  no masses, no hepatosplenomegaly.  BACK: No tenderness, no spasm,   EXTREMITIES:  hypertrophic changes to his left knee ,   NEURO:  No focal deficit, speech fluent, he walks with an antalgic  gait, strength and tone, sensory intact      Lab Results   Component Value Date    AST 15 10/02/2017    ALT 22 10/02/2017       No results found.          ASSESSMENT AND PLAN:   Fransisco David is a 80 year old male, with a hx of severe degenerative arthritis of his left knee  who presents for a pre-operative physical exam. Patient is to have a left total knee arthroplasty  by Dr. Behery  on 5/9/2025.      ICD-10-CM     1. Primary osteoarthritis of left knee  M17.12       2. Primary hypertension  I10       3. Mixed hyperlipidemia  E78.2       4. Slow transit constipation  K59.01       5. Benign prostatic hyperplasia with nocturia  N40.1     R35.1       6. Vitamin D deficiency  E55.9       7. Atherosclerosis of native coronary artery of native heart without angina pectoris  I25.10       8. History of colonic polyps  Z86.0100       9. Primary osteoarthritis involving multiple joints  M15.0     S/P right hip and right knee arthroplasty      10. History of skin cancer in adulthood  Z85.828       11. Lumbar spondylosis with myelopathy S/P multilevel laminectomy and discectomy  M47.16       12. BMI 31.0-31.9,adult  Z68.31       13. Type 2 diabetes mellitus without complication, without long-term current use of insulin (HCC)  E11.9 CBC With Differential With Platelet     Comp Metabolic Panel (14)     EKG 12 Lead     Hemoglobin A1C     MSSA and MRSA Culture Screen     XR CHEST PA + LAT CHEST (CPT=71046)     Urinalysis with Culture Reflex     PTT, Activated     Prothrombin Time (PT)      14. Shortness of breath  R06.02 CBC With Differential With Platelet     Comp Metabolic Panel (14)     EKG 12 Lead     Hemoglobin A1C     MSSA and MRSA Culture Screen     XR CHEST PA + LAT CHEST (CPT=71046)     Urinalysis with Culture Reflex     PTT, Activated     Prothrombin Time (PT)      15. Pre-operative laboratory examination  Z01.812 CBC With Differential With Platelet     Comp Metabolic Panel (14)     EKG 12 Lead     Hemoglobin A1C     MSSA and MRSA Culture Screen     XR CHEST PA + LAT CHEST (CPT=71046)     Urinalysis with Culture Reflex     PTT, Activated     Prothrombin Time (PT)      16. Preop examination  Z01.818 CBC With Differential With Platelet     Comp Metabolic Panel (14)     EKG 12 Lead     Hemoglobin A1C     MSSA and MRSA Culture Screen     XR CHEST PA + LAT CHEST (CPT=71046)     Urinalysis with Culture Reflex     PTT, Activated      Prothrombin Time (PT)        Problem List[5]     ECG and labs are pending review     Preoperative Risk Stratification: There are no decompensated medical conditions. ASA classification 2    Medical history:  High risk surgery (vascular, thoracic, intra-peritoneal): No  CAD: Yes  CHF: No  Stroke: No  DM on insulin: No  Serum Creatinine >2 mg/dl: No  Patient has an RCRI score that is intermediate risk and is at intermediate risk for major cardiac event in the perioperative period.     Patient is medically optimized and has an acceptable risk of surgery and may proceed with surgery as planned.     PLAN:    Patient to discontinue medications and supplements with anticoagulation properties as per instruction.        Postoperative Recommendations:    Anticoagulation / DVT prophylaxis: SCDs, early ambulation. ASA 81 mg twice daily with food for four weeks.    GI protection: Protonix  Incentive Spirometry   Telemetry as needed  CPAP/O2 as needed   DM: QID glucoscans and sliding scale insulin as needed   Renal protection: (hydration / NSAID and ACE/ARB avoidance)   Cognitive protection: (minimize narcotics, benzodiazepines, scopolamine)     Pain management and Physical therapy as per Orthopedic service.   Home Health as needed    Thank you for the opportunity to care for your patient and to assist in managing the postoperative course.        Johan Reid,   4/29/2025  9:26 AM         [1]   Family History  Problem Relation Age of Onset    Other (Other) Father         cirrohsis    Cancer Mother         liver    Other (Other) Brother         MI   [2]   Current Outpatient Medications   Medication Sig Dispense Refill    atorvastatin 20 MG Oral Tab Take 1 tablet (20 mg total) by mouth daily.      ramipril 10 MG Oral Cap Take 1 capsule (10 mg total) by mouth daily.      metFORMIN 500 MG Oral Tab Take 1 tablet (500 mg total) by mouth 2 (two) times daily with meals.      tamsulosin 0.4 MG Oral Cap Take 1 capsule (0.4 mg total) by  mouth daily.      polyethylene glycol, PEG 3350, 17 g Oral Powd Pack Take 17 g by mouth daily.      Multiple Vitamin (ONE-DAILY MULTI VITAMINS) Oral Tab Take 1 tablet by mouth daily.      Cholecalciferol (VITAMIN D3 OR) Take 1,000 Units by mouth daily.     [3]   Past Medical History:   CAD (coronary artery disease)    Cancer (HCC)    skin    Diabetes type 2 (HCC)    History of colon polyps    HTN (hypertension)    Hyperlipidemia    Osteoarthritis    Visual impairment    reading   [4]   Past Surgical History:  Procedure Laterality Date    Anesth,lower arm surgery Right     wrist surgery    Cataract Bilateral     Hip replacement surgery Right 2007    Skin surgery      skin cancer removed from face and chest    Total knee replacement Right 2014   [5] There is no problem list on file for this patient.

## 2025-05-01 NOTE — TELEPHONE ENCOUNTER
Spoke to patient, went over all test results and let him know he is clear for surgery per Dr. Reid.

## 2025-05-01 NOTE — TELEPHONE ENCOUNTER
Dr. Reid, please review:    Labs- FBS I(116), A1C (5.9), MRSA neg, all other labs WNL    EKG- WNL    X-ray- no cardiopulmonary process    Cardiology- Dr. Paras Mckinney  Last seen: 04/23/25  Patient doing well overall. He has a history of PAD but no major CAD. He does not have angina. He has relatively good exercise tolerance despite his knee. EKG is normal. Given this, I think his risks can be acceptable although elevated given his comorbidities.As it has been quite a few years since his last stress test, I did offer him a Lexiscan. Patient feels he is doing okay and does not want to do 1.I did order an echo with Dopplers to make sure his LV function is normal. If it is indeed normal, he should be at acceptable risk.    Cardiac Clx scanned into Media 05/01/25:  \"Acceptable cardiac risk for knee surgery. Normal EF with no wall motion abnormalities or valve disease.\"    Echo scanned into Media 05/01/25    OK for surgery?

## 2025-05-09 ENCOUNTER — HOSPITAL ENCOUNTER (OUTPATIENT)
Facility: HOSPITAL | Age: 80
Discharge: HOME HEALTH | End: 2025-05-10
Attending: STUDENT IN AN ORGANIZED HEALTH CARE EDUCATION/TRAINING PROGRAM | Admitting: STUDENT IN AN ORGANIZED HEALTH CARE EDUCATION/TRAINING PROGRAM
Payer: MEDICARE

## 2025-05-09 ENCOUNTER — APPOINTMENT (OUTPATIENT)
Dept: GENERAL RADIOLOGY | Facility: HOSPITAL | Age: 80
End: 2025-05-09
Attending: STUDENT IN AN ORGANIZED HEALTH CARE EDUCATION/TRAINING PROGRAM
Payer: MEDICARE

## 2025-05-09 ENCOUNTER — ANESTHESIA EVENT (OUTPATIENT)
Dept: SURGERY | Facility: HOSPITAL | Age: 80
End: 2025-05-09
Payer: MEDICARE

## 2025-05-09 ENCOUNTER — ANESTHESIA (OUTPATIENT)
Dept: SURGERY | Facility: HOSPITAL | Age: 80
End: 2025-05-09
Payer: MEDICARE

## 2025-05-09 DIAGNOSIS — M17.12 PRIMARY OSTEOARTHRITIS OF LEFT KNEE: ICD-10-CM

## 2025-05-09 PROBLEM — N40.0 BPH (BENIGN PROSTATIC HYPERPLASIA): Status: ACTIVE | Noted: 2025-05-09

## 2025-05-09 PROBLEM — I10 ESSENTIAL HYPERTENSION: Status: ACTIVE | Noted: 2025-05-09

## 2025-05-09 PROBLEM — E78.5 HYPERLIPIDEMIA: Status: ACTIVE | Noted: 2025-05-09

## 2025-05-09 PROBLEM — E11.9 DIABETES TYPE 2 (HCC): Status: ACTIVE | Noted: 2025-05-09

## 2025-05-09 LAB
ANION GAP SERPL CALC-SCNC: 6 MMOL/L (ref 0–18)
BUN BLD-MCNC: 17 MG/DL (ref 9–23)
BUN/CREAT SERPL: 17.3 (ref 10–20)
CALCIUM BLD-MCNC: 9 MG/DL (ref 8.7–10.4)
CHLORIDE SERPL-SCNC: 107 MMOL/L (ref 98–112)
CO2 SERPL-SCNC: 27 MMOL/L (ref 21–32)
CREAT BLD-MCNC: 0.98 MG/DL (ref 0.7–1.3)
DEPRECATED RDW RBC AUTO: 46.1 FL (ref 35.1–46.3)
EGFRCR SERPLBLD CKD-EPI 2021: 77 ML/MIN/1.73M2 (ref 60–?)
ERYTHROCYTE [DISTWIDTH] IN BLOOD BY AUTOMATED COUNT: 12.9 % (ref 11–15)
GLUCOSE BLD-MCNC: 127 MG/DL (ref 70–99)
GLUCOSE BLDC GLUCOMTR-MCNC: 102 MG/DL (ref 70–99)
GLUCOSE BLDC GLUCOMTR-MCNC: 103 MG/DL (ref 70–99)
GLUCOSE BLDC GLUCOMTR-MCNC: 192 MG/DL (ref 70–99)
GLUCOSE BLDC GLUCOMTR-MCNC: 97 MG/DL (ref 70–99)
HCT VFR BLD AUTO: 47.6 % (ref 39–53)
HGB BLD-MCNC: 15.2 G/DL (ref 13–17.5)
MCH RBC QN AUTO: 31.1 PG (ref 26–34)
MCHC RBC AUTO-ENTMCNC: 31.9 G/DL (ref 31–37)
MCV RBC AUTO: 97.5 FL (ref 80–100)
OSMOLALITY SERPL CALC.SUM OF ELEC: 293 MOSM/KG (ref 275–295)
PLATELET # BLD AUTO: 178 10(3)UL (ref 150–450)
POTASSIUM SERPL-SCNC: 4.8 MMOL/L (ref 3.5–5.1)
RBC # BLD AUTO: 4.88 X10(6)UL (ref 3.8–5.8)
SODIUM SERPL-SCNC: 140 MMOL/L (ref 136–145)
WBC # BLD AUTO: 5.3 X10(3) UL (ref 4–11)

## 2025-05-09 PROCEDURE — 73560 X-RAY EXAM OF KNEE 1 OR 2: CPT | Performed by: STUDENT IN AN ORGANIZED HEALTH CARE EDUCATION/TRAINING PROGRAM

## 2025-05-09 PROCEDURE — 99204 OFFICE O/P NEW MOD 45 MIN: CPT | Performed by: HOSPITALIST

## 2025-05-09 DEVICE — EMPOWR PS KNEETM TIBIAL INSERT, SIZE 7, 10MM
Type: IMPLANTABLE DEVICE | Site: KNEE | Status: FUNCTIONAL
Brand: DJO SURGICAL

## 2025-05-09 DEVICE — EMPOWR PS KNEETM FEMUR, NONPOROUS, 7L
Type: IMPLANTABLE DEVICE | Site: KNEE | Status: FUNCTIONAL
Brand: DJO SURGICAL

## 2025-05-09 DEVICE — DOMED TRI-PEG PATELLA, 35X9MM, E-PLUS
Type: IMPLANTABLE DEVICE | Site: KNEE | Status: FUNCTIONAL
Brand: DJO SURGICAL

## 2025-05-09 DEVICE — IMPLANTABLE DEVICE
Type: IMPLANTABLE DEVICE | Site: KNEE | Status: FUNCTIONAL
Brand: REFOBACIN® BONE CEMENT R

## 2025-05-09 DEVICE — DJO EMPOWR KNEETM, UNIVERSAL BP, NP 7L
Type: IMPLANTABLE DEVICE | Site: KNEE | Status: FUNCTIONAL
Brand: DJO SURGICAL

## 2025-05-09 RX ORDER — OXYCODONE HYDROCHLORIDE 5 MG/1
5 TABLET ORAL EVERY 4 HOURS PRN
Status: DISCONTINUED | OUTPATIENT
Start: 2025-05-09 | End: 2025-05-10

## 2025-05-09 RX ORDER — ACETAMINOPHEN 500 MG
1000 TABLET ORAL ONCE
Status: DISCONTINUED | OUTPATIENT
Start: 2025-05-09 | End: 2025-05-09 | Stop reason: HOSPADM

## 2025-05-09 RX ORDER — LIDOCAINE HYDROCHLORIDE 10 MG/ML
INJECTION, SOLUTION INFILTRATION; PERINEURAL
Status: DISCONTINUED | OUTPATIENT
Start: 2025-05-09 | End: 2025-05-09 | Stop reason: SURG

## 2025-05-09 RX ORDER — TAMSULOSIN HYDROCHLORIDE 0.4 MG/1
0.4 CAPSULE ORAL DAILY
Status: DISCONTINUED | OUTPATIENT
Start: 2025-05-10 | End: 2025-05-10

## 2025-05-09 RX ORDER — SODIUM CHLORIDE, SODIUM LACTATE, POTASSIUM CHLORIDE, CALCIUM CHLORIDE 600; 310; 30; 20 MG/100ML; MG/100ML; MG/100ML; MG/100ML
INJECTION, SOLUTION INTRAVENOUS CONTINUOUS
Status: DISCONTINUED | OUTPATIENT
Start: 2025-05-09 | End: 2025-05-09 | Stop reason: HOSPADM

## 2025-05-09 RX ORDER — DEXAMETHASONE SODIUM PHOSPHATE 4 MG/ML
VIAL (ML) INJECTION AS NEEDED
Status: DISCONTINUED | OUTPATIENT
Start: 2025-05-09 | End: 2025-05-09 | Stop reason: SURG

## 2025-05-09 RX ORDER — ROPIVACAINE HYDROCHLORIDE 5 MG/ML
INJECTION, SOLUTION EPIDURAL; INFILTRATION; PERINEURAL
Status: DISCONTINUED | OUTPATIENT
Start: 2025-05-09 | End: 2025-05-09 | Stop reason: SURG

## 2025-05-09 RX ORDER — DEXAMETHASONE SODIUM PHOSPHATE 10 MG/ML
INJECTION, SOLUTION INTRAMUSCULAR; INTRAVENOUS
Status: DISCONTINUED | OUTPATIENT
Start: 2025-05-09 | End: 2025-05-09 | Stop reason: SURG

## 2025-05-09 RX ORDER — NICOTINE POLACRILEX 4 MG
30 LOZENGE BUCCAL
Status: DISCONTINUED | OUTPATIENT
Start: 2025-05-09 | End: 2025-05-09 | Stop reason: HOSPADM

## 2025-05-09 RX ORDER — ACETAMINOPHEN 500 MG
1000 TABLET ORAL EVERY 6 HOURS
Status: DISCONTINUED | OUTPATIENT
Start: 2025-05-09 | End: 2025-05-10

## 2025-05-09 RX ORDER — HYDROMORPHONE HYDROCHLORIDE 1 MG/ML
0.4 INJECTION, SOLUTION INTRAMUSCULAR; INTRAVENOUS; SUBCUTANEOUS EVERY 5 MIN PRN
Status: DISCONTINUED | OUTPATIENT
Start: 2025-05-09 | End: 2025-05-09 | Stop reason: HOSPADM

## 2025-05-09 RX ORDER — CELECOXIB 200 MG/1
400 CAPSULE ORAL ONCE
Status: COMPLETED | OUTPATIENT
Start: 2025-05-09 | End: 2025-05-09

## 2025-05-09 RX ORDER — NICOTINE POLACRILEX 4 MG
30 LOZENGE BUCCAL
Status: DISCONTINUED | OUTPATIENT
Start: 2025-05-09 | End: 2025-05-10

## 2025-05-09 RX ORDER — NICOTINE POLACRILEX 4 MG
15 LOZENGE BUCCAL
Status: DISCONTINUED | OUTPATIENT
Start: 2025-05-09 | End: 2025-05-09 | Stop reason: HOSPADM

## 2025-05-09 RX ORDER — ONDANSETRON 2 MG/ML
4 INJECTION INTRAMUSCULAR; INTRAVENOUS EVERY 6 HOURS PRN
Status: DISCONTINUED | OUTPATIENT
Start: 2025-05-09 | End: 2025-05-10

## 2025-05-09 RX ORDER — FAMOTIDINE 20 MG/1
20 TABLET, FILM COATED ORAL ONCE
Status: COMPLETED | OUTPATIENT
Start: 2025-05-09 | End: 2025-05-09

## 2025-05-09 RX ORDER — MORPHINE SULFATE 4 MG/ML
4 INJECTION, SOLUTION INTRAMUSCULAR; INTRAVENOUS EVERY 10 MIN PRN
Status: DISCONTINUED | OUTPATIENT
Start: 2025-05-09 | End: 2025-05-09 | Stop reason: HOSPADM

## 2025-05-09 RX ORDER — ATORVASTATIN CALCIUM 20 MG/1
20 TABLET, FILM COATED ORAL DAILY
Status: DISCONTINUED | OUTPATIENT
Start: 2025-05-10 | End: 2025-05-10

## 2025-05-09 RX ORDER — NALOXONE HYDROCHLORIDE 0.4 MG/ML
0.08 INJECTION, SOLUTION INTRAMUSCULAR; INTRAVENOUS; SUBCUTANEOUS AS NEEDED
Status: DISCONTINUED | OUTPATIENT
Start: 2025-05-09 | End: 2025-05-09 | Stop reason: HOSPADM

## 2025-05-09 RX ORDER — TRAMADOL HYDROCHLORIDE 50 MG/1
50 TABLET ORAL EVERY 6 HOURS SCHEDULED
Status: DISCONTINUED | OUTPATIENT
Start: 2025-05-09 | End: 2025-05-10

## 2025-05-09 RX ORDER — FAMOTIDINE 10 MG/ML
20 INJECTION, SOLUTION INTRAVENOUS ONCE
Status: COMPLETED | OUTPATIENT
Start: 2025-05-09 | End: 2025-05-09

## 2025-05-09 RX ORDER — METOCLOPRAMIDE HYDROCHLORIDE 5 MG/ML
10 INJECTION INTRAMUSCULAR; INTRAVENOUS EVERY 8 HOURS PRN
Status: DISCONTINUED | OUTPATIENT
Start: 2025-05-09 | End: 2025-05-10

## 2025-05-09 RX ORDER — DEXTROSE MONOHYDRATE 25 G/50ML
50 INJECTION, SOLUTION INTRAVENOUS
Status: DISCONTINUED | OUTPATIENT
Start: 2025-05-09 | End: 2025-05-10

## 2025-05-09 RX ORDER — METOCLOPRAMIDE 10 MG/1
10 TABLET ORAL ONCE
Status: COMPLETED | OUTPATIENT
Start: 2025-05-09 | End: 2025-05-09

## 2025-05-09 RX ORDER — SODIUM CHLORIDE 9 MG/ML
INJECTION, SOLUTION INTRAVENOUS CONTINUOUS
Status: DISCONTINUED | OUTPATIENT
Start: 2025-05-09 | End: 2025-05-10

## 2025-05-09 RX ORDER — DIPHENHYDRAMINE HCL 25 MG
25 CAPSULE ORAL EVERY 4 HOURS PRN
Status: DISCONTINUED | OUTPATIENT
Start: 2025-05-09 | End: 2025-05-10

## 2025-05-09 RX ORDER — DIPHENHYDRAMINE HYDROCHLORIDE 50 MG/ML
25 INJECTION, SOLUTION INTRAMUSCULAR; INTRAVENOUS ONCE AS NEEDED
Status: ACTIVE | OUTPATIENT
Start: 2025-05-09 | End: 2025-05-09

## 2025-05-09 RX ORDER — MORPHINE SULFATE 4 MG/ML
2 INJECTION, SOLUTION INTRAMUSCULAR; INTRAVENOUS EVERY 10 MIN PRN
Status: DISCONTINUED | OUTPATIENT
Start: 2025-05-09 | End: 2025-05-09 | Stop reason: HOSPADM

## 2025-05-09 RX ORDER — SODIUM PHOSPHATE, DIBASIC AND SODIUM PHOSPHATE, MONOBASIC 7; 19 G/230ML; G/230ML
1 ENEMA RECTAL ONCE AS NEEDED
Status: DISCONTINUED | OUTPATIENT
Start: 2025-05-09 | End: 2025-05-10

## 2025-05-09 RX ORDER — TRANEXAMIC ACID 10 MG/ML
INJECTION, SOLUTION INTRAVENOUS AS NEEDED
Status: DISCONTINUED | OUTPATIENT
Start: 2025-05-09 | End: 2025-05-09 | Stop reason: SURG

## 2025-05-09 RX ORDER — DEXTROSE MONOHYDRATE 25 G/50ML
50 INJECTION, SOLUTION INTRAVENOUS
Status: DISCONTINUED | OUTPATIENT
Start: 2025-05-09 | End: 2025-05-09 | Stop reason: HOSPADM

## 2025-05-09 RX ORDER — HYDROMORPHONE HYDROCHLORIDE 1 MG/ML
0.6 INJECTION, SOLUTION INTRAMUSCULAR; INTRAVENOUS; SUBCUTANEOUS EVERY 5 MIN PRN
Status: DISCONTINUED | OUTPATIENT
Start: 2025-05-09 | End: 2025-05-09 | Stop reason: HOSPADM

## 2025-05-09 RX ORDER — BISACODYL 10 MG
10 SUPPOSITORY, RECTAL RECTAL
Status: DISCONTINUED | OUTPATIENT
Start: 2025-05-09 | End: 2025-05-10

## 2025-05-09 RX ORDER — SODIUM CHLORIDE, SODIUM LACTATE, POTASSIUM CHLORIDE, CALCIUM CHLORIDE 600; 310; 30; 20 MG/100ML; MG/100ML; MG/100ML; MG/100ML
INJECTION, SOLUTION INTRAVENOUS CONTINUOUS
Status: DISCONTINUED | OUTPATIENT
Start: 2025-05-09 | End: 2025-05-10

## 2025-05-09 RX ORDER — DIPHENHYDRAMINE HYDROCHLORIDE 50 MG/ML
12.5 INJECTION, SOLUTION INTRAMUSCULAR; INTRAVENOUS EVERY 4 HOURS PRN
Status: DISCONTINUED | OUTPATIENT
Start: 2025-05-09 | End: 2025-05-10

## 2025-05-09 RX ORDER — METOCLOPRAMIDE HYDROCHLORIDE 5 MG/ML
10 INJECTION INTRAMUSCULAR; INTRAVENOUS ONCE
Status: COMPLETED | OUTPATIENT
Start: 2025-05-09 | End: 2025-05-09

## 2025-05-09 RX ORDER — HYDROMORPHONE HYDROCHLORIDE 1 MG/ML
0.2 INJECTION, SOLUTION INTRAMUSCULAR; INTRAVENOUS; SUBCUTANEOUS EVERY 5 MIN PRN
Status: DISCONTINUED | OUTPATIENT
Start: 2025-05-09 | End: 2025-05-09 | Stop reason: HOSPADM

## 2025-05-09 RX ORDER — KETOROLAC TROMETHAMINE 15 MG/ML
15 INJECTION, SOLUTION INTRAMUSCULAR; INTRAVENOUS EVERY 6 HOURS
Status: DISCONTINUED | OUTPATIENT
Start: 2025-05-09 | End: 2025-05-10

## 2025-05-09 RX ORDER — DOCUSATE SODIUM 100 MG/1
100 CAPSULE, LIQUID FILLED ORAL 2 TIMES DAILY
Status: DISCONTINUED | OUTPATIENT
Start: 2025-05-09 | End: 2025-05-10

## 2025-05-09 RX ORDER — GLYCOPYRROLATE 0.2 MG/ML
INJECTION, SOLUTION INTRAMUSCULAR; INTRAVENOUS AS NEEDED
Status: DISCONTINUED | OUTPATIENT
Start: 2025-05-09 | End: 2025-05-09 | Stop reason: SURG

## 2025-05-09 RX ORDER — SENNOSIDES 8.6 MG
17.2 TABLET ORAL NIGHTLY
Status: DISCONTINUED | OUTPATIENT
Start: 2025-05-09 | End: 2025-05-10

## 2025-05-09 RX ORDER — ONDANSETRON 2 MG/ML
INJECTION INTRAMUSCULAR; INTRAVENOUS AS NEEDED
Status: DISCONTINUED | OUTPATIENT
Start: 2025-05-09 | End: 2025-05-09 | Stop reason: SURG

## 2025-05-09 RX ORDER — ACETAMINOPHEN 500 MG
1000 TABLET ORAL ONCE
Status: COMPLETED | OUTPATIENT
Start: 2025-05-09 | End: 2025-05-09

## 2025-05-09 RX ORDER — ASPIRIN 81 MG/1
81 TABLET ORAL 2 TIMES DAILY
Status: DISCONTINUED | OUTPATIENT
Start: 2025-05-09 | End: 2025-05-10

## 2025-05-09 RX ORDER — RAMIPRIL 10 MG/1
10 CAPSULE ORAL DAILY
Status: DISCONTINUED | OUTPATIENT
Start: 2025-05-10 | End: 2025-05-10

## 2025-05-09 RX ORDER — NICOTINE POLACRILEX 4 MG
15 LOZENGE BUCCAL
Status: DISCONTINUED | OUTPATIENT
Start: 2025-05-09 | End: 2025-05-10

## 2025-05-09 RX ORDER — MORPHINE SULFATE 10 MG/ML
6 INJECTION, SOLUTION INTRAMUSCULAR; INTRAVENOUS EVERY 10 MIN PRN
Status: DISCONTINUED | OUTPATIENT
Start: 2025-05-09 | End: 2025-05-09 | Stop reason: HOSPADM

## 2025-05-09 RX ORDER — POLYETHYLENE GLYCOL 3350 17 G/17G
17 POWDER, FOR SOLUTION ORAL DAILY PRN
Status: DISCONTINUED | OUTPATIENT
Start: 2025-05-09 | End: 2025-05-10

## 2025-05-09 RX ORDER — BUPIVACAINE HYDROCHLORIDE 7.5 MG/ML
INJECTION, SOLUTION INTRASPINAL AS NEEDED
Status: DISCONTINUED | OUTPATIENT
Start: 2025-05-09 | End: 2025-05-09 | Stop reason: SURG

## 2025-05-09 RX ADMIN — DEXAMETHASONE SODIUM PHOSPHATE 4 MG: 4 MG/ML VIAL (ML) INJECTION at 12:09:00

## 2025-05-09 RX ADMIN — GLYCOPYRROLATE 0.15 MG: 0.2 INJECTION, SOLUTION INTRAMUSCULAR; INTRAVENOUS at 12:14:00

## 2025-05-09 RX ADMIN — DEXAMETHASONE SODIUM PHOSPHATE 10 MG: 10 INJECTION, SOLUTION INTRAMUSCULAR; INTRAVENOUS at 11:50:00

## 2025-05-09 RX ADMIN — ROPIVACAINE HYDROCHLORIDE 20 ML: 5 INJECTION, SOLUTION EPIDURAL; INFILTRATION; PERINEURAL at 11:50:00

## 2025-05-09 RX ADMIN — ONDANSETRON 4 MG: 2 INJECTION INTRAMUSCULAR; INTRAVENOUS at 12:09:00

## 2025-05-09 RX ADMIN — SODIUM CHLORIDE, SODIUM LACTATE, POTASSIUM CHLORIDE, CALCIUM CHLORIDE: 600; 310; 30; 20 INJECTION, SOLUTION INTRAVENOUS at 11:58:00

## 2025-05-09 RX ADMIN — BUPIVACAINE HYDROCHLORIDE 1.6 ML: 7.5 INJECTION, SOLUTION INTRASPINAL at 12:08:00

## 2025-05-09 RX ADMIN — LIDOCAINE HYDROCHLORIDE 5 ML: 10 INJECTION, SOLUTION INFILTRATION; PERINEURAL at 11:50:00

## 2025-05-09 RX ADMIN — SODIUM CHLORIDE, SODIUM LACTATE, POTASSIUM CHLORIDE, CALCIUM CHLORIDE: 600; 310; 30; 20 INJECTION, SOLUTION INTRAVENOUS at 14:15:00

## 2025-05-09 RX ADMIN — TRANEXAMIC ACID 1000 MG: 10 INJECTION, SOLUTION INTRAVENOUS at 12:10:00

## 2025-05-09 NOTE — ADDENDUM NOTE
Addendum  created 05/09/25 1438 by Misael Fowler MD    Child order released for a procedure order, Clinical Note Signed, Intraprocedure Blocks edited, SmartForm saved

## 2025-05-09 NOTE — ANESTHESIA POSTPROCEDURE EVALUATION
Patient: Fransisco David Sr.    Procedure Summary       Date: 05/09/25 Room / Location: [facility] MAIN OR 04 / [facility] MAIN OR    Anesthesia Start: 1158 Anesthesia Stop:     Procedure: Left total knee arthroplasty (Left: Knee) Diagnosis:       Primary osteoarthritis of left knee      (Primary Osteoathritis of Left Knee)    Surgeons: Behery, Omar Atef, MD Anesthesiologist: Shanti Keller MD    Anesthesia Type: spinal, regional ASA Status: 3            Anesthesia Type: No value filed.    Vitals Value Taken Time   /70 05/09/25 14:21   Temp 97.8 05/09/25 14:25   Pulse 83 05/09/25 14:24   Resp 16 05/09/25 14:24   SpO2 97 % 05/09/25 14:24   Vitals shown include unfiled device data.    [facility] AN Post Evaluation:   Patient Evaluated in PACU  Patient Participation: complete - patient participated  Level of Consciousness: awake and alert  Pain Score: 0  Pain Management: adequate  Airway Patency:patent  Yes    Nausea/Vomiting: none  Cardiovascular Status: acceptable  Respiratory Status: acceptable  Postoperative Hydration acceptable      Tami Gil CRNA  5/9/2025 2:25 PM

## 2025-05-09 NOTE — ANESTHESIA PREPROCEDURE EVALUATION
Anesthesia PreOp Note    HPI:     Fransisco David Sr. is a 80 year old male who presents for preoperative consultation requested by: Behery, Omar Atef, MD    Date of Surgery: 5/9/2025    Procedure(s):  Left total knee arthroplasty  Indication: Primary Osteoathritis of Left Knee    Relevant Problems   No relevant active problems       NPO:  Last Liquid Consumption Date: 05/08/25  Last Liquid Consumption Time: 2100  Last Solid Consumption Date: 05/08/25  Last Solid Consumption Time: 1930  Last Liquid Consumption Date: 05/08/25          History Review:  There are no active problems to display for this patient.      Past Medical History[1]    Past Surgical History[2]    Prescriptions Prior to Admission[3]  Current Medications and Prescriptions Ordered in Epic[4]    Allergies[5]    Family History[6]  Social Hx on file[7]    Available pre-op labs reviewed.  Lab Results   Component Value Date    WBC 5.0 04/29/2025    RBC 4.73 04/29/2025    HGB 15.1 04/29/2025    HCT 45.3 04/29/2025    MCV 95.8 04/29/2025    MCH 31.9 04/29/2025    MCHC 33.3 04/29/2025    RDW 12.6 04/29/2025    .0 04/29/2025     Lab Results   Component Value Date     04/29/2025    K 4.4 04/29/2025     04/29/2025    CO2 29.0 04/29/2025    BUN 16 04/29/2025    CREATSERUM 0.97 04/29/2025     (H) 04/29/2025    PGLU 103 (H) 05/09/2025    CA 8.9 04/29/2025     Lab Results   Component Value Date    INR 0.96 04/29/2025       Vital Signs:  Body mass index is 30.85 kg/m².   height is 1.702 m (5' 7\") and weight is 89.4 kg (197 lb). His oral temperature is 97.3 °F (36.3 °C). His blood pressure is 135/74 and his pulse is 75. His respiration is 18 and oxygen saturation is 97%.   Vitals:    04/29/25 1225 05/09/25 0959   BP:  135/74   Pulse:  75   Resp:  18   Temp:  97.3 °F (36.3 °C)   TempSrc:  Oral   SpO2:  97%   Weight: 91.6 kg (202 lb) 89.4 kg (197 lb)   Height: 1.702 m (5' 7\") 1.702 m (5' 7\")        Anesthesia Evaluation     Patient  summary reviewed and Nursing notes reviewed    No history of anesthetic complications   Airway   Mallampati: I  TM distance: >3 FB  Neck ROM: full  Dental      Pulmonary - negative ROS and normal exam   Cardiovascular - normal exam  (+) hypertension, CAD    ROS comment: Normal echo 2025    Neuro/Psych      GI/Hepatic/Renal      Endo/Other    (+) diabetes mellitus, arthritis  Abdominal                  Anesthesia Plan:   ASA:  3  Plan:   Spinal and regional  Informed Consent Plan and Risks Discussed With:  Patient      I have informed Fransisco David Sr. and/or legal guardian or family member of the nature of the anesthetic plan, benefits, risks including possible dental damage if relevant, major complications, and any alternative forms of anesthetic management.   All of the patient's questions were answered to the best of my ability. The patient desires the anesthetic management as planned.  Luis F Sharpe MD  5/9/2025 11:21 AM  Present on Admission:  **None**           [1]   Past Medical History:   Back problem    CAD (coronary artery disease)    Cancer (HCC)    skin    Cataract    Diabetes (HCC)    Diabetes type 2 (HCC)    High blood pressure    High cholesterol    History of colon polyps    HTN (hypertension)    Hyperlipidemia    Osteoarthritis    Visual impairment    reading glasses   [2]   Past Surgical History:  Procedure Laterality Date    Anesth,lower arm surgery Right     wrist surgery    Cataract Bilateral     Colonoscopy      Hip replacement surgery Right 2007    Skin surgery      skin cancer removed from face and chest    Spine surgery procedure unlisted      Total knee replacement Right 2014   [3]   Medications Prior to Admission   Medication Sig Dispense Refill Last Dose/Taking    atorvastatin 20 MG Oral Tab Take 1 tablet (20 mg total) by mouth in the morning.   5/8/2025 at  7:00 AM    ramipril 10 MG Oral Cap Take 1 capsule (10 mg total) by mouth in the evening.   5/7/2025    metFORMIN 500 MG Oral  Tab Take 1 tablet (500 mg total) by mouth 2 (two) times daily with meals.   2025 at  7:00 AM    tamsulosin 0.4 MG Oral Cap Take 1 capsule (0.4 mg total) by mouth in the morning.   2025 at  7:00 PM    polyethylene glycol, PEG 3350, 17 g Oral Powd Pack Take 17 g by mouth in the morning.   2025 at  6:00 AM    Multiple Vitamin (ONE-DAILY MULTI VITAMINS) Oral Tab Take 1 tablet by mouth daily.   2025    Cholecalciferol (VITAMIN D3 OR) Take 1,000 Units by mouth daily.   2025   [4]   Current Facility-Administered Medications Ordered in Epic   Medication Dose Route Frequency Provider Last Rate Last Admin    lactated ringers infusion   Intravenous Continuous Behery, Omar Atef, MD 20 mL/hr at 25 1036 New Bag at 25 1036    acetaminophen (Tylenol Extra Strength) tab 1,000 mg  1,000 mg Oral Once Behery, Omar Atef, MD        ceFAZolin (Ancef) 2g in 10mL IV syringe premix  2 g Intravenous Once Kojo Baxter PA-C        clonidine-EPINEPHrine-ropivacaine-ketorolac (CERTS) (Duraclon-Adrenalin-Naropin-Toradol) pain cocktail irrigation   Intra-articular Once (Intra-Op) Behery, Omar Atef, MD         No current [facility]-ordered outpatient medications on file.   [5]   Allergies  Allergen Reactions    Neomycin-Bacitracin-Polymyxin RASH     With regular use   [6]   Family History  Problem Relation Age of Onset    Other (Other) Father         cirrohsis    Cancer Mother         liver    Other (Other) Brother         MI   [7]   Social History  Socioeconomic History    Marital status:    Tobacco Use    Smoking status: Former     Types: Cigarettes     Start date:      Quit date:      Years since quittin.3     Passive exposure: Past    Smokeless tobacco: Never   Vaping Use    Vaping status: Never Used   Substance and Sexual Activity    Alcohol use: Yes     Comment: socially    Drug use: Never

## 2025-05-09 NOTE — ANESTHESIA PROCEDURE NOTES
Peripheral Block    Date/Time: 5/9/2025 11:50 AM    Performed by: Misael Fowler MD  Authorized by: Shanti Keller MD      General Information and Staff    Start Time:  5/9/2025 11:50 AM  End Time:  5/9/2025 11:53 AM  Anesthesiologist:  Misael Fowler MD  Performed by:  Anesthesiologist  Patient Location:  PACU      Site Identification: real time ultrasound guided and image stored and retrievable      Reason for Block: at surgeon's request and post-op pain management    Preanesthetic Checklist: 2 patient identifers, IV checked, risks and benefits discussed, monitors and equipment checked, pre-op evaluation, timeout performed, anesthesia consent and sterile technique used      Procedure Details    Patient Position:  Supine  Prep: ChloraPrep    Monitoring:  Cardiac monitor  Block Type:  Saphenous  Laterality:  Left  Injection Technique:  Single-shot    Needle    Needle Type:   Reason for Ultrasound Use: appropriate spread of the medication was noted in real time and no ultrasound evidence of intravascular and/or intraneural injection            Assessment    Injection Assessment:  Good spread noted, incremental injection, low pressure, local visualized surrounding nerve on ultrasound, negative aspiration for heme and no pain on injection  Paresthesia Pain:  None  Heart Rate Change: No        Medications  5/9/2025 11:50 AM  lidocaine injection 1% - Intradermal   5 mL - 5/9/2025 11:50:00 AM  ropivacaine (NAROPIN) injection 0.5% - Infiltration   20 mL - 5/9/2025 11:50:00 AM  dexamethasone (DECADRON) PF injection 10 mg/ml - Injection   10 mg - 5/9/2025 11:50:00 AM    Additional Comments

## 2025-05-09 NOTE — PHYSICAL THERAPY NOTE
PHYSICAL THERAPY EVALUATION - INPATIENT     Room Number: Room 2/Room 2-A  Evaluation Date: 5/9/2025  Type of Evaluation: Initial   Physician Order: PT Eval and Treat    Presenting Problem: Pt is s/p left TKA . WBAT left LE post sx .  PMH right TKA  2004  and Right JOSE RAUL  2007/ Spine sx 2022/ B shoulder OA/ baseline balance difficulties including turning     Reason for Therapy: Mobility Dysfunction and Discharge Planning    PHYSICAL THERAPY ASSESSMENT   Patient is a 80 year old male admitted 5/9/2025 for Presenting Problem: Pt is s/p left TKA . WBAT left LE post sx .  PMH right TKA  2004  and Right JOSE RAUL  2007/ Spine sx 2022/ B shoulder OA/ baseline balance difficulties including turning    Prior to admission, patient's baseline is Indep ADL , community ambulation with SPC with hx of balance difficulties yet denies falls prior to admission. Pt reports able to drive and in the home does not use an AD .  Patient is currently functioning below baseline with bed mobility, transfers, gait, stair negotiation, standing prolonged periods, and performing household tasks.  Patient is requiring  min with brief mod support during ambulation  as a result of the following impairments: decreased functional strength, decreased endurance/aerobic capacity, pain, impaired standing  balance, impaired coordination, decreased muscular endurance, medical status, and limited left knee and B shoulder  ROM.  Physical Therapy will continue to follow for duration of hospitalization.    Patient will benefit from continued skilled PT Services at discharge to promote prior level of function.  Anticipate patient will return home with home health PT.    Pt with goal for DC to home setting with his spouse , pt reports his dtr will also stay temporarily to help out.    Pt has a RW .  DC Plans pending pt progress and further acute management this admission .   Pt did not demonstrate readiness for DC at this time.      PLAN DURING HOSPITALIZATION  Nursing  Mobility Recommendation : 1 Assist  PT Device Recommendation: Rolling walker, Gait belt  PT Treatment Plan: Bed mobility, Body mechanics, Endurance, Energy conservation, Patient education, Family education, Gait training, Balance training, Transfer training, Stair training, Stoop training  Rehab Potential : Good  Frequency (Obs): BID     PHYSICAL THERAPY MEDICAL/SOCIAL HISTORY   History related to current admission:     From ortho MD        Post-operative Plan:     The patient will receive post-operative antibiotics for 24 hours as surgical prophylaxis.   VTE prophylaxis will consist of early mobilization, mechanical compressive devices, and chemoprophylaxis as per outpatient visit plan.  The patient will be weight-bearing as tolerated and allowed to mobilize with physical therapy.  The patient will be permitted range of motion as tolerated.    The patient will follow up in clinic in 2 weeks for a wound check, and radiographic evaluation.                     Problem List  Principal Problem:    Primary osteoarthritis of left knee  Active Problems:    Essential hypertension    Hyperlipidemia    Diabetes type 2 (HCC)    BPH (benign prostatic hyperplasia)      HOME SITUATION  Type of Home: House  Home Layout: Multi-level, Able to live on main level (bedroom on main level)  Stairs to Enter : 0        Stairs to Bedroom: 0 (2 steps to kitchen and living room with wall to grab unto)         Lives With: Spouse (Dtr staying temporarily)    Drives: Yes   Patient Regularly Uses: Cane (Indep ADL and community ambulator with SPC , no AD in home, able to drive . No recent falls)         SUBJECTIVE    \"I feel shakey , I have balance trouble especially when I turn \"     Goal for home   Pt denies left LE knee buckling sensation reports LE sensation intact     PHYSICAL THERAPY EXAMINATION   OBJECTIVE  Precautions: Limb alert - left  Fall Risk: High fall risk    WEIGHT BEARING RESTRICTION  L Lower Extremity: Weight Bearing as  Tolerated      PAIN ASSESSMENT  Ratin  Location: left knee  resting 5/10 increase to 6/10  Management Techniques: Activity promotion, Body mechanics, Breathing techniques, Relaxation, Repositioning, Other (Comment)    COGNITION  Overall Cognitive Status:  WFL - within functional limits    RANGE OF MOTION AND STRENGTH ASSESSMENT    Pt with fxn use of distal B LE , pt with significant loss of B shoulder AROM contributing to fxn loss of B UE use observed during session.        Right Lower extremity ROM is within functional limits   Right Lower extremity strength is within functional limits     Decreased left knee ROM and strength post sx.  Left LE wrapped in ace wrap dressing . Pt able to return demonstration of B AP / B QS / pt lifted left LE off bed with good knee control simulating SLR , seated left LAQ in limited ROM , left HS in limited ROM and left seated knee flexion in limited ROM .  Pt reports good tolerance for post op exercises , reps for instruction purpose with respect for pain / bandage .  During ambulation therapy noted two brief moment of left knee instability / pt instability required brief mod assist.       BALANCE  Static Sitting: Fair +  Dynamic Sitting: Fair +  Static Standing: Poor +  Dynamic Standing: Poor       ACTIVITY TOLERANCE  Pulse: 83 (resting   post actiivty 97)        BP: 132/89 (resting    post activity  139/71)             O2 WALK  Oxygen Therapy  SPO2% on Room Air at Rest: 95  SPO2% Ambulation on Room Air: 98    AM-PAC '6-Clicks' INPATIENT SHORT FORM - BASIC MOBILITY  How much difficulty does the patient currently have...  Patient Difficulty: Turning over in bed (including adjusting bedclothes, sheets and blankets)?: A Little   Patient Difficulty: Sitting down on and standing up from a chair with arms (e.g., wheelchair, bedside commode, etc.): A Little   Patient Difficulty: Moving from lying on back to sitting on the side of the bed?: A Little   How much help from another person  does the patient currently need...   Help from Another: Moving to and from a bed to a chair (including a wheelchair)?: A Little   Help from Another: Need to walk in hospital room?: A Lot   Help from Another: Climbing 3-5 steps with a railing?: A Lot     AM-PAC Score:  Raw Score: 16   Approx Degree of Impairment: 54.16%   Standardized Score (AM-PAC Scale): 40.78   CMS Modifier (G-Code): CK    FUNCTIONAL ABILITY STATUS  Functional Mobility/Gait Assessment  Gait Assistance: Other (Comment) (Min overall with brief mod assist x 2 during distance once as turning and once when distracted observed instability with need for increase support)  Distance (ft): 40---50 ft x 1  Assistive Device: Rolling walker  Pattern: L Decreased stance time, R Steppage, L Steppage, R Foot flat, L Foot flat (Stiff LE gait , pt verbalized feeling shakey with baseline balance difficulty especially with turning.  Brief instability of pt / left LE x 2 during distance required brief mod assist for recovery ,  Informed RN and CNA encouraged use of gait belt / 1A)    Supine to Sit: minimal assist    Sit to Stand: minimal assist    Skilled Therapy Provided: PT eval , fxn mobility training , initated post op exercises with good tolerance and respect for pain ./bandage , pt education and DC Planning      The patient's Approx Degree of Impairment: 54.16% has been calculated based on documentation in the [facility] '6 clicks' Inpatient Basic Mobility Short Form.  Research supports that patients with this level of impairment may benefit from rehab facility .  Final disposition will be made by interdisciplinary medical team.    Patient End of Session: Up in chair, Needs met, Call light within reach, RN aware of session/findings, All patient questions and concerns addressed, Hospital anti-slip socks, Ice applied    CURRENT GOALS  Goals to be met by: 5/25/25  Patient Goal Patient's self-stated goal is: home with family    Goal #1 Patient is able to demonstrate  supine - sit EOB @ level: supervision     Goal #1   Current Status    Goal #2 Patient is able to demonstrate transfers EOB to/from Chair/Wheelchair at assistance level: supervision with walker - rolling     Goal #2  Current Status    Goal #3 Patient is able to ambulate 150  feet with assist device: walker - rolling at assistance level: CGA   Goal #3   Current Status    Goal #4 Patient will negotiate 0-4 stairs/one curb w/ assistive device min assist    Goal #4   Current Status    Goal #5 Patient to demonstrate independence with home activity/exercise instructions provided to patient in preparation for discharge.   Goal #5   Current Status    Goal #6    Goal #6  Current Status      Patient Evaluation Complexity Level:  History Moderate - 1 or 2 personal factors and/or co-morbidities   Examination of body systems Low -  addressing 1-2 elements   Clinical Presentation  Moderate - Evolving   Clinical Decision Making  Moderate Complexity   PT eval and therapy activity 2 units

## 2025-05-09 NOTE — ANESTHESIA PROCEDURE NOTES
Spinal Block    Date/Time: 5/9/2025 12:03 PM    Performed by: Luis F Sharpe MD  Authorized by: Luis F Sharpe MD      General Information and Staff    Start Time:  5/9/2025 12:03 PM  End Time:  5/9/2025 12:08 PM  Anesthesiologist:  Luis F Sharpe MD  Performed by:  Anesthesiologist  Patient Location:  OR  Reason for Block: at surgeon's request and surgical anesthesia    Preanesthetic Checklist: patient identified, IV checked, risks and benefits discussed, monitors and equipment checked, pre-op evaluation, timeout performed, anesthesia consent and sterile technique used      Procedure Details    Patient Position:  Sitting  Prep: ChloraPrep    Monitoring:  Cardiac monitor  Approach:  Midline  Location:  L3-4  Injection Technique:  Single-shot    Needle    Needle Type:  Pencil-tip  Needle Gauge:  24 G  Needle Length:  3.5 in    Assessment    Sensory Level:   Events: clear CSF, CSF aspirated, well tolerated and blood negative      Additional Comments     Tolerated well. No complications

## 2025-05-09 NOTE — CONSULTS
[facility]    PATIENT'S NAME: SHY CASON SR.   ATTENDING PHYSICIAN: Omar Behery, MD   CONSULTING PHYSICIAN: Young You MD   PATIENT ACCOUNT#:   [id number]    LOCATION:   Room 2 A [facility]  MEDICAL RECORD #:   [id number]       YOB: 1945  ADMISSION DATE:       05/09/2025      CONSULT DATE:  05/09/2025    REPORT OF CONSULTATION      REASON FOR ADMISSION:  Post left total knee arthroplasty.    HISTORY OF PRESENT ILLNESS:  Patient is an 80-year-old  male with chronic left knee pain and underlying severe primary osteoarthritis, failed outpatient conservative medical management options.  Scheduled today for above-mentioned procedure by his orthopedic surgeon, Dr. Behery.  Preoperatively, he had saphenous and spinal blocks.  Postoperatively, transferred to PACU for further monitoring.     PAST MEDICAL HISTORY:  Generalized osteoarthritis, degenerative joint disease of lumbar spine, diabetes mellitus type 2, benign prostatic hypertrophy, hypertension, and hyperlipidemia.    PAST SURGICAL HISTORY:  Right wrist surgery, cataract procedure, right total hip arthroplasty, basal cell carcinoma resection, lumbar laminectomy, and right total knee arthroplasty.    MEDICATIONS:  Please see medication reconciliation list.     ALLERGIES:  No known drug allergies.      SOCIAL HISTORY:  Ex-tobacco user.  Social alcohol.  No drug use.      FAMILY HISTORY:  Father had liver cirrhosis.  Mother had liver cancer.    REVIEW OF SYSTEMS:  Currently resting in bed.  No left knee pain.  No chest pain.  No shortness of breath.  Other 12-point review of systems is negative.       PHYSICAL EXAMINATION:    GENERAL:  Alert and oriented to time, place and person.  No acute distress.   VITAL SIGNS:  Temperature 97.1, pulse 65, respiratory rate 14, blood pressure 100/73, pulse ox 95% on 2L nasal cannula oxygen.  HEENT:  Atraumatic.  Oropharynx clear.  Moist mucous membranes.  Ears and nose normal.  Eyes:   Anicteric sclerae.   NECK:  Supple.  No lymphadenopathy.  Trachea midline.  Full range of motion.   LUNGS:  Clear to auscultation bilaterally.  Normal respiratory effort.   HEART:  Regular rate and rhythm.  S1 and S2 auscultated.  No murmur.    ABDOMEN:  Soft, nondistended.  No tenderness.  Positive bowel sounds.   EXTREMITIES:  Left knee dressing.  No leg edema.  NEUROLOGIC:  Decreased sensation and muscle movement in both lower extremities, post spinal block.  No other focal findings.     ASSESSMENT AND PLAN:    1.   Left knee primary osteoarthritis, status post left total knee arthroplasty.  Spinal block.  Saphenous nerve block.  Pain control.  Neurovascular checks.  Aspirin for DVT prophylaxis.  Physical and occupational therapy.  2.   Essential hypertension.  Continue home medications and monitor.  3.   Hyperlipidemia.  Continue home medications.  4.   Diabetes mellitus type 2.  Continue home medications.  Monitor Accu-Cheks.  Sliding scale insulin.   5.   Benign prostatic hypertrophy.  Continue home medications.    Dictated By Young You MD  d: 05/09/2025 15:10:31  t: 05/09/2025 16:20:03  Job [id number]  FB/    cc: Omar Behery, MD

## 2025-05-09 NOTE — DISCHARGE INSTRUCTIONS
DISCHARGE INSTRUCTIONS:  PLACE ICE TO SURGICAL AREA 20-30 MINUTES ON/ 20-30 MINUTES OFF. THIS IS TO HELP WITH PAIN & SWELLING.    TAKE YOUR MEDICATIONS BELOW AS PRESCRIBED BY YOUR DOCTOR. THESE HAVE BEEN SENT TO YOUR PHARMACY.    IT IS OK TO BEAR WEIGHT AS TOLERATED ON THE OPERATIVE EXTREMITY.     CALL TO CONFIRM YOUR FOLLOW UP APPOINTMENT WITH DR. BEHERY TO BE SEEN IN 2-3 WEEKS POSTOP. [phone]    MEDICATIONS:    POST OP MEDICATION REGIMEN              MULTI-MODAL   PAIN REGIMEN       DRUG   FOR   FREQUENCY & DURATION   QTY   NOTES     WEANING  TIPS                Tylenol 500mg     Mild pain   Take 2 tabs every 6 hours     90   Can purchase over-the-counter    Stop using medication if no longer having pain.      Tramadol 50mg     Moderate pain   Take 1-2 tabs every 6 hours only as needed   45 May prescribe a refill, but ideally, this should be tapered off after surgery Stop using this medication 2nd   As pain decreases over time, increase the interval between doses (6 hours to 8, then 12, then 24) to taper off the medication.      Meloxicam 15mg     Inflammation   Take 1 tab daily for 30 days     30   May refill if needed beyond 30 days   Recommend completing the 30 day supply.           BREAKTHROUGH PAIN         Oxycodone 5mg       Severe pain     Take 1-2 tabs every 4-6 hours only as needed for severe pain       40   Take at least 1 hr apart from Tramadol.    Ideally, no refill. The goal is to taper off this medication as soon as possible, as it can be addictive and have side effects.    Stop using this medication 1st if no longer having severe pain.         BLOOD THINNER     Aspirin 81mg   Blood clot prevention   Take 1 tab twice daily for 30 days     60     No refills   Complete the entire course of your blood thinner.                      STOOL SOFTENER     Sennokot 8.6/50mg   Constipation   Take 1 tab twice daily  while on opioids     60 Refer to discharge instructions if constipation persists even after  taking this medication   Stop taking if having diarrhea or loose stools.           ANTI-NAUSEA   Ondansetron 4mg   Nausea   Take 1 tab every 8 hours as needed if nauseous     20   No Refill      ANTI-ACID REFLUX / GASTRITIS   Pantoprazole 40mg   Acid reflux, gastric ulcer prophylaxis   Take 1 tab every day along with Meloxicam     60   May refill if Meloxicam refilled          DRESSING:    YOU MAY REMOVE ACE BANDAGE AND COTTON WRAP 2  DAYS AFTER SURGERY    YOU HAVE A SPECIAL DRESSING CALLED AN AQUACEL DRESSING OVER YOUR INCISION.    THE DRESSING STAYS FOR 12 DAYS FROM SURGERY.    YOU MAY SHOWER AS SOON AS YOU RETURN HOME WITH THE AQUACEL DRESSING INTACT OVER YOUR INCISION AREA.    IF THE DRESSING LEAKS OR COMES LOOSE BEFORE THE 7 DAY PERIOD CONTACT YOUR DOCTOR / SURGEON.    AFTER   12 DAYS THE DRESSING IS REMOVED BY PULLING ON THE EDGE OF THE DRESSING AND GENTLY STRETCHING IT, THEN PEEL IT OFF SLOWLY LIKE A BANDAID. IF YOU HAVE ANY QUESTIONS OR CONCERNS ABOUT THE DRESSING CONTACT YOUR DOCTOR.    IF DRAINAGE IS PRESENT AT ANYTIME FROM YOUR DRESSING OR FROM YOUR INCISION CALL YOUR DOCTOR / SURGEON AS SOON AS POSSIBLE.      REASONS TO CALL YOUR DOCTOR:  TEMPERATURE .0 OR MORE  PERSISTANT NAUSEA OR VOMITTING - ESPECIALLY IF YOU ARE UNABLE TO EAT OR DRINK.  INCREASED LEVEL OF PAIN OR PAIN WHICH IS NOT CONTROLLED BY YOUR PAIN MEDICINE.  PERSISTENT DRAINAGE AT ANYTIME FROM YOUR SURGICAL AREA / INCISION.  PAIN, TENDERNESS OR SUDDEN SWELLING IN YOUR CALF REGION OF THE LOWER EXTREMITIES - THIS IS A POSSIBLE SIGN OF A BLOOD CLOT.  ANY CHANGES IN SENSATION IN YOUR BODY IN THE AREA OF YOUR SURGERY = NUMBNESS OR TINGLING.  ANY CHANGES IN CIRCULATION IN YOUR BODY IN THE AREA OF YOUR SURGERY = CHANGES  IN COLOR EITHER DARKER OR VERY PALE; OR  IF AREA FEELS COLD TO THE TOUCH AS COMPARED TO OTHER AREAS.  ANY CHANGES IN FUNCTION IN YOUR BODY IN THE AREA OF YOUR SURGERY = CHANGE IN MOVEMENT - UNABLE TO MOVE OR WIGGLE FINGERS,  TOES OR FOOT OR ANY OTHER CHANGES IN NORMAL BODY FUNCTIONING.  FOR ANY OF THE ABOVE OR ANY OTHER QUESTIONS OR CONCERNS CALL YOUR DOCTOR/ SURGEON AS SOON AS POSSIBLE.        Omar Behery, MD MPH  Orthopaedic Surgeon, Adult Hip and Knee Reconstruction  [facility] at [address], [facility] [address]  [place] [address]  Office: (P) [phone] (F) [phone]  Adminstrative Assistant: Daria Prince     Dear Patient,     [place] [facility] cares about your progress with recovery following your joint replacement surgery.     300 days from your scheduled surgery, [facility] will send you a follow-up survey to help us understand how your surgery impacted your mobility, pain, and overall quality of life. Please make every effort to complete this survey. The information collected from this survey will be used by your physician to track your recovery.     Sincerely,     [facility] [place]

## 2025-05-09 NOTE — CM/SW NOTE
Sw received per protocol MDO for post surgical. Leana sent HH referrals to surgeons preferred HH providers.     4:19PM  Sw check [place] for updates, Surgeon's preferred [facility] accepted Pt. Leana reserved [facility] in [place] and sent message to liaison regarding choice and DC    Plan: Home with [facility]    SW to remain available for support and/or discharge planning.    Jasmin Pearson MSW, LSW   [id number]

## 2025-05-09 NOTE — INTERVAL H&P NOTE
Pre-op Diagnosis: Primary Osteoathritis of Left Knee    The above referenced H&P was reviewed by Kojo Baxter PA-C on 5/9/2025, the patient was examined and no significant changes have occurred in the patient's condition since the H&P was performed.  I discussed with the patient and/or legal representative the potential benefits, risks and side effects of this procedure; the likelihood of the patient achieving goals; and potential problems that might occur during recuperation.  I discussed reasonable alternatives to the procedure, including risks, benefits and side effects related to the alternatives and risks related to not receiving this procedure.  We will proceed with procedure as planned.

## 2025-05-09 NOTE — OPERATIVE REPORT
[facility]  OPERATIVE REPORT    DATE OF SURGERY: 5/9/25    PREOPERATIVE DIAGNOSIS:   Left knee osteoarthritis    POST-OPERATIVE DIAGNOSIS:   Left knee osteoarthritis    PROCEDURE:  Left primary total knee arthroplasty with patellar resurfacing    Location: [facility]    SURGEON: Omar A Behery, MD  Assistant(s): Kojo EMERY CSA    Anesthesia type:  Spinal  Estimated Blood Loss: 50cc    Drains: None    Complications: None immediately apparent    Findings: End stage osteoarthritis with full thickness cartilage loss    Specimen: Resected bone and cartilage    Implants:  DJO Empowr PS size 7 femoral component  DJO Empowr Universal Tibial Baseplate size 7  Poly: 10mm PS insert  35mm Cemented symmetric all poly patellar button    Indication:   This is an 80 year old man, who presented with chronic knee pain refractory to non-operative treatment (including pain relieving medication, corticosteroid injections and physical therapy), and impairing activities of daily living. Radiographic evaluation demonstrated knee osteoarthritis with varus deformity. Surgical versus non-surgical options were discussed with the patient, and together we decided it is best to proceed with a total knee arthroplasty with the goals of pain relief and improvement in function. All risks and benefits of surgery including bleeding, infection, instability, ashley-prosthetic fracture, extensor mechanism injury, neurovascular injury, as well as medical and anesthesia-related complications were discussed with the patient / family, who elected to proceed with surgery.     Procedure in detail:    Following correct identification of the patient and the correct extremity, the patient was taken to the operating room and positioned supine on a regular table. Ashley-operative antibiotics and the above anesthesia were administered prior to incision per the standard protocol. A non-sterile padded tourniquet was placed on the thigh  during the procedure and the extremity was prepped and draped in the usual sterile fashion. The extremity was exsanguinated using Esmarch bandage and the tourniquet was raised to 250mmHg.    A midline incision was made medial to the tibial tubercle centered over patella taken down to the joint capsule of the knee. Minimal subfascial flaps were created. A medial parapatellar arthrotomy was performed. The deep MCL was subperiosteally elevated at the medial proximal tibial joint line for exposure. The fat pad was released off of the tibial plateau laterally. The patella was subluxed laterally and the knee was flexed. The remnants of the anterior horn of the medial and lateral meniscus were removed as well as the ACL and PCL from the intercondylar notch. All distal protruding osteophytes were removed.     We then identified the appropriate starting point on the distal femur based on preoperative templating and accessed the intramedullary canal of the femur using a drill. The canal was lavaged, suctioned and the intramedullary  distal femur cutting guide was inserted. The distal femur was resected in the appropriate amount valgus according to the preoperative template. The resection depth and level was checked to be appropriate and our attention was then turned to the tibia.     The tibia was subluxed anteriorly and exposed with retractors. The lateral meniscus was excised. An external medullary cutting guide was attached using proximal tibial pins, and the proximal tibial surface was resected in an alignment perpendicular to the mechanical axis, and to a depth accommodating the smallest insert/tibial baseplate thickness.     The knee was then brought into full extension and our extension gap was assessed. Appropriate releases were performed in extension on the side of the concavity of the deformity to form a rectangular flexion space and spacer block was placed securely into a rectangular extension space giving good  collateral stability and recreation of our mechanical axis.     The knee was then flexed to 90 degrees. The femur was appropriately sized based on posterior referencing. A tensioner device was inserted into the flexion space and tensioned at 90 degrees, establishing appropriate femoral external rotation, and the posterior referencing holes were drilled. The hilary-posterior 4-in-1 cutting block was then pinned in the appropriate amount of external rotation based on a symmetric flexion gap. We then performed the anterior/posterior and chamfer cuts for the femoral component. A spacer block with a drop jaleesa was used to verify balance and mechanical alignment.   A laminar  was then placed laterally first then medially, allowing exposure and removal of the remnants of the posterior horns of the medial and lateral menisci as well as any posterior osteophyte. Pain cocktail injection was delivered into the posterior capsule.     The tibia was then subluxed anteriorly and sized. The tibial template guide was pinned, reamed and the keel was punched in the appropriate amount of external rotation. A trial tibial baseplate was inserted. The femur was then exposed again, and a trial femur was placed, the knee was reduced with a trial insert and was taken through a range of motion and found to be stable in the varus/valgus plane 0-30 degrees of flexion and the AP plane at 90 degrees of flexion.     Our attention was then turned to the patella. A symmetric resection was performed using an oscillating saw to recreate native patella height. All extraneous osteophyte and synovium was removed. A trial button was placed and the patella tracked centrally using the no thumbs technique. All trial components were removed. The final components were opened on the back table. The femur and the tibia were copiously lavaged and dried to allow maximum cement interdigitation. A periarticular pain cocktail injection was delivered to the soft  tissues. Two packs of high viscosity cement (Refobacin) were vacuum mixed and the components were cemented (tibia, femur then patella. The knee was reduced with a trial insert in place and the cement was allowed to cure. Any excess cement was removed to minimize third body wear. Once the cement cured, the tourniquet was released meticulous hemostasis was obtained. Any extraneous cement was removed from around the knee taking particular care to remove extraneous cement from the posterior aspect of the knee. The final insert was then placed onto the tibial tray, impacted and locked. Stability testing was consistent with intraoperative trialing and the patella tracked centrally.     The knee was then copiously lavaged with dilute betadine and saline irrigation.  The deep capsule was closed with #1 Vicryl interrupted suture and a #2 quill barbed suture. The subcutaneous tissue was closed with 2-0 monocryl absorbable suture and the skin was closed with 3-0 monocryl and dermabond. A sterile occlusive dressing was applied.     The patient tolerated the procedure well and taken to the recovery unit without immediate complications.     There was no qualified resident available to assist because qualified residents were assisting with other surgery. The PA assistant was necessary for help with positioning, draping, retraction, and wound closure.     Post-operative Plan:    The patient will receive post-operative antibiotics for 24 hours as surgical prophylaxis.   VTE prophylaxis will consist of early mobilization, mechanical compressive devices, and chemoprophylaxis as per outpatient visit plan.  The patient will be weight-bearing as tolerated and allowed to mobilize with physical therapy.  The patient will be permitted range of motion as tolerated.    The patient will follow up in clinic in 2 weeks for a wound check, and radiographic evaluation.

## 2025-05-10 VITALS
HEART RATE: 82 BPM | WEIGHT: 197 LBS | RESPIRATION RATE: 19 BRPM | HEIGHT: 67 IN | BODY MASS INDEX: 30.92 KG/M2 | TEMPERATURE: 98 F | OXYGEN SATURATION: 95 % | SYSTOLIC BLOOD PRESSURE: 127 MMHG | DIASTOLIC BLOOD PRESSURE: 67 MMHG

## 2025-05-10 LAB — GLUCOSE BLDC GLUCOMTR-MCNC: 97 MG/DL (ref 70–99)

## 2025-05-10 PROCEDURE — 99214 OFFICE O/P EST MOD 30 MIN: CPT | Performed by: STUDENT IN AN ORGANIZED HEALTH CARE EDUCATION/TRAINING PROGRAM

## 2025-05-10 RX ORDER — TAMSULOSIN HYDROCHLORIDE 0.4 MG/1
0.4 CAPSULE ORAL DAILY
Status: DISCONTINUED | OUTPATIENT
Start: 2025-05-10 | End: 2025-05-10

## 2025-05-10 NOTE — PROGRESS NOTES
[facility]     ORTHO DAILY PROGRESS NOTE    Patient: Fransisco David Sr.    Subjective:  Nothing acute overnight.  Pain in the operative extremity is well controlled.  Patient denies new onset numbness/tingling in the operative extremity.  Patient also denies chest pain / shortness of breath, nausea/vomiting.      Objective:  Vitals:    05/10/25 0805   BP: 127/67   Pulse: 82   Resp: 19   Temp: 98.2 °F (36.8 °C)     I/O last 3 completed shifts:  In: 1100 [I.V.:1000; IV PIGGYBACK:100]  Out: 325 [Urine:325]     Gen: awake, alert, not in acute distress  Abdomen nondistended, non-tender    Left Lower Extremity: dressing clean, dry, intact.    Sensation intact to light touch in Sural/Saphenous/Tibial/Superficial Peroneal/Deep Peroneal and Tibial distributions.   Motor exam : 5/5 EHL/FHL/TA/GSC.  Vascular exam: Palpable dorsalis pedis pulses. Cap refill ~2s in the toes. Foot warm and well perfused    Labs:  Lab Results   Component Value Date    HGB 15.2 05/09/2025    HGB 15.1 04/29/2025     Lab Results   Component Value Date    INR 0.96 04/29/2025         ASSESSMENT/PLAN: 80 year old yo male s/p left total knee arthroplasty on  5/9/2025   - Weight bearing status: WBAT LLE  - Range of motion: As tolerated  - Mechanical DVT prophylaxis and chemoprophylaxis with ASA 81 BID   - 24 hours of perioperative antibiotics  - PT for mobilization and motion  - Advance diet as tolerated. Discontinue IV fluids POD1 if tolerating diet.   - Multimodal pain control regimen ordered  - Disposition: Pending PT Evaluation. Home with [facility]. No Home health needed    Omar Behery, MD  [facility]

## 2025-05-10 NOTE — DISCHARGE SUMMARY
[facility]  part of [facility]    Discharge Summary    Fransisco David . Patient Status:  Outpatient in a Bed    1945 MRN [id number]   Location [facility] Attending No att. providers found   Hosp Day # 0 PCP Tressa Bowman MD     Date of Admission: 2025   Date of Discharge: 5/10/2025 12:36 PM    Admitting Diagnosis: Primary Osteoathritis of Left Knee    Disposition: Home    Discharge Diagnosis: .Principal Problem:    Primary osteoarthritis of left knee  Active Problems:    Essential hypertension    Hyperlipidemia    Diabetes type 2 (HCC)    BPH (benign prostatic hyperplasia)      Hospital Course:   Reason for Admission: Planned left TKA     Discharge Physical Exam:     GENERAL:  Alert and oriented to time, place and person.  No acute distress.   HEENT:  Atraumatic.  Oropharynx clear.  Moist mucous membranes.  Ears and nose normal.  Eyes:  LUNGS:  Clear to auscultation bilaterally.  Normal respiratory effort.   HEART:  Regular rate and rhythm.  S1 and S2 auscultated.  No murmur.    ABDOMEN:  Soft, nondistended.  No tenderness.  Positive bowel sounds.   EXTREMITIES:  Left knee dressing.  No leg edema.  NEUROLOGIC: no focal deficits        Hospital Course:   Patient is an 80-year-old  male with chronic left knee pain and underlying severe primary osteoarthritis, failed outpatient conservative medical management options. Was admitted and underwent scheduled left total knee arthroplasty on 25 with Dr. Omar Behery. Postoperatively, hospital course uneventful. Pain controlled. Diet advanced. Patient worked with PT/OT and was discharged home. DVT prophylaxis and pain control was prescribed by ortho team. ER precautions reviewed. Follow up appointments given.         Complications: None    Consultants         Provider   Role Specialty     Young You MD      Consulting Physician HOSPITALIST          Surgical Procedures       Case IDs Date Procedure Surgeon  Location Status    [id number] 5/9/25 Left total knee arthroplasty Behery, Omar Atef, MD [facility] MAIN OR [facility]              Discharge Plan:   Discharge Condition: Stable    Discharge Medication List as of 5/10/2025 12:08 PM      ASA 81 mg BID      Home Meds - Unchanged    Details   atorvastatin 20 MG Oral Tab Take 1 tablet (20 mg total) by mouth in the morning., Historical      ramipril 10 MG Oral Cap Take 1 capsule (10 mg total) by mouth in the evening., Historical      metFORMIN 500 MG Oral Tab Take 1 tablet (500 mg total) by mouth 2 (two) times daily with meals., Historical      tamsulosin 0.4 MG Oral Cap Take 1 capsule (0.4 mg total) by mouth at bedtime., Historical      polyethylene glycol, PEG 3350, 17 g Oral Powd Pack Take 17 g by mouth in the morning., Historical      Multiple Vitamin (ONE-DAILY MULTI VITAMINS) Oral Tab Take 1 tablet by mouth daily., Historical      Cholecalciferol (VITAMIN D3 OR) Take 1,000 Units by mouth daily., Historical                 Discharge Diet: As tolerated    Discharge Activity: As tolerated Per ortho recs     Follow up:      Follow-up Information       Tressa Bowman MD. Go in 2 day(s).    Specialty: Internal Medicine  Why: For hospital discharge follow up  Contact information:  [address]  [place] [address] [phone]               Behery, Omar Atef, MD. Schedule an appointment as soon as possible for a visit in 2 week(s).    Specialties: Surgery, Orthopaedic, SURGERY, ORTHOPEDIC  Why: For wound re-check; surgical follow up  Contact information:  [address]  [address]  [place] [address] [phone]                                   Other Discharge Instructions:         DISCHARGE INSTRUCTIONS:  PLACE ICE TO SURGICAL AREA 20-30 MINUTES ON/ 20-30 MINUTES OFF. THIS IS TO HELP WITH PAIN & SWELLING.    TAKE YOUR MEDICATIONS BELOW AS PRESCRIBED BY YOUR DOCTOR. THESE HAVE BEEN SENT TO YOUR PHARMACY.    IT IS OK TO BEAR WEIGHT AS TOLERATED ON THE  OPERATIVE EXTREMITY.     CALL TO CONFIRM YOUR FOLLOW UP APPOINTMENT WITH DR. BEHERY TO BE SEEN IN 2-3 WEEKS POSTOP. [phone]    MEDICATIONS:    POST OP MEDICATION REGIMEN              MULTI-MODAL   PAIN REGIMEN       DRUG   FOR   FREQUENCY & DURATION   QTY   NOTES     WEANING  TIPS                Tylenol 500mg     Mild pain   Take 2 tabs every 6 hours     90   Can purchase over-the-counter    Stop using medication if no longer having pain.      Tramadol 50mg     Moderate pain   Take 1-2 tabs every 6 hours only as needed   45 May prescribe a refill, but ideally, this should be tapered off after surgery Stop using this medication 2nd   As pain decreases over time, increase the interval between doses (6 hours to 8, then 12, then 24) to taper off the medication.      Meloxicam 15mg     Inflammation   Take 1 tab daily for 30 days     30   May refill if needed beyond 30 days   Recommend completing the 30 day supply.           BREAKTHROUGH PAIN         Oxycodone 5mg       Severe pain     Take 1-2 tabs every 4-6 hours only as needed for severe pain       40   Take at least 1 hr apart from Tramadol.    Ideally, no refill. The goal is to taper off this medication as soon as possible, as it can be addictive and have side effects.    Stop using this medication 1st if no longer having severe pain.         BLOOD THINNER     Aspirin 81mg   Blood clot prevention   Take 1 tab twice daily for 30 days     60     No refills   Complete the entire course of your blood thinner.                      STOOL SOFTENER     Sennokot 8.6/50mg   Constipation   Take 1 tab twice daily  while on opioids     60 Refer to discharge instructions if constipation persists even after taking this medication   Stop taking if having diarrhea or loose stools.           ANTI-NAUSEA   Ondansetron 4mg   Nausea   Take 1 tab every 8 hours as needed if nauseous     20   No Refill      ANTI-ACID REFLUX / GASTRITIS   Pantoprazole 40mg   Acid reflux, gastric ulcer  prophylaxis   Take 1 tab every day along with Meloxicam     60   May refill if Meloxicam refilled          DRESSING:    YOU MAY REMOVE ACE BANDAGE AND COTTON WRAP 2  DAYS AFTER SURGERY    YOU HAVE A SPECIAL DRESSING CALLED AN AQUACEL DRESSING OVER YOUR INCISION.    THE DRESSING STAYS FOR 12 DAYS FROM SURGERY.    YOU MAY SHOWER AS SOON AS YOU RETURN HOME WITH THE AQUACEL DRESSING INTACT OVER YOUR INCISION AREA.    IF THE DRESSING LEAKS OR COMES LOOSE BEFORE THE 7 DAY PERIOD CONTACT YOUR DOCTOR / SURGEON.    AFTER   12 DAYS THE DRESSING IS REMOVED BY PULLING ON THE EDGE OF THE DRESSING AND GENTLY STRETCHING IT, THEN PEEL IT OFF SLOWLY LIKE A BANDAID. IF YOU HAVE ANY QUESTIONS OR CONCERNS ABOUT THE DRESSING CONTACT YOUR DOCTOR.    IF DRAINAGE IS PRESENT AT ANYTIME FROM YOUR DRESSING OR FROM YOUR INCISION CALL YOUR DOCTOR / SURGEON AS SOON AS POSSIBLE.      REASONS TO CALL YOUR DOCTOR:  TEMPERATURE .0 OR MORE  PERSISTANT NAUSEA OR VOMITTING - ESPECIALLY IF YOU ARE UNABLE TO EAT OR DRINK.  INCREASED LEVEL OF PAIN OR PAIN WHICH IS NOT CONTROLLED BY YOUR PAIN MEDICINE.  PERSISTENT DRAINAGE AT ANYTIME FROM YOUR SURGICAL AREA / INCISION.  PAIN, TENDERNESS OR SUDDEN SWELLING IN YOUR CALF REGION OF THE LOWER EXTREMITIES - THIS IS A POSSIBLE SIGN OF A BLOOD CLOT.  ANY CHANGES IN SENSATION IN YOUR BODY IN THE AREA OF YOUR SURGERY = NUMBNESS OR TINGLING.  ANY CHANGES IN CIRCULATION IN YOUR BODY IN THE AREA OF YOUR SURGERY = CHANGES  IN COLOR EITHER DARKER OR VERY PALE; OR  IF AREA FEELS COLD TO THE TOUCH AS COMPARED TO OTHER AREAS.  ANY CHANGES IN FUNCTION IN YOUR BODY IN THE AREA OF YOUR SURGERY = CHANGE IN MOVEMENT - UNABLE TO MOVE OR WIGGLE FINGERS, TOES OR FOOT OR ANY OTHER CHANGES IN NORMAL BODY FUNCTIONING.  FOR ANY OF THE ABOVE OR ANY OTHER QUESTIONS OR CONCERNS CALL YOUR DOCTOR/ SURGEON AS SOON AS POSSIBLE.        Omar Behery, MD MPH  Orthopaedic Surgeon, Adult Hip and Knee Reconstruction  [facility]   [address]  [place] [address]  Office: (P) [phone] (F) [phone]  Adminstrative Assistant: Daria Prince     Dear Patient,     [facility] cares about your progress with recovery following your joint replacement surgery.     300 days from your scheduled surgery, [facility] will send you a follow-up survey to help us understand how your surgery impacted your mobility, pain, and overall quality of life. Please make every effort to complete this survey. The information collected from this survey will be used by your physician to track your recovery.     Sincerely,     [facility]             >30 minutes spent on discharge orders, coordination, and planning.     Allyn Cox MD  5/10/2025

## 2025-05-10 NOTE — CM/SW NOTE
05/10/25 1000   Discharge disposition   Expected discharge disposition Home-Health   Post Acute Care Provider   ([facility])   Discharge transportation Private car     Update 1141    Per RN Emi, pt was pre-arranged with outpatient in the home [facility].    SW confirmed with VIKI Middleton who stated pt is medically ready for discharge today.  DC order placed.    PLAN: DC home with KUNAL CROWE MSW, LSW  Discharge Planner

## 2025-05-10 NOTE — PHYSICAL THERAPY NOTE
PHYSICAL THERAPY KNEE TREATMENT NOTE - INPATIENT     Room Number: Room 2/Room 2-A             Presenting Problem: Pt is s/p left TKA . WBAT left LE post sx .  PMH right TKA    and Right JOSE RAUL  / Spine sx  shoulder OA/ baseline balance difficulties including turning       Problem List  Principal Problem:    Primary osteoarthritis of left knee  Active Problems:    Essential hypertension    Hyperlipidemia    Diabetes type 2 (HCC)    BPH (benign prostatic hyperplasia)      PHYSICAL THERAPY ASSESSMENT   Patient demonstrates good  progress this session, goals  remain in progress.      Patient is requiring supervision and contact guard assist as a result of the following impairments: decreased functional strength, decreased endurance/aerobic capacity, pain, decreased muscular endurance, and limited left knee ROM.     Patient continues to function below baseline with gait, stair negotiation, standing prolonged periods, and performing household tasks.  Next session anticipate patient to progress gait, stair negotiation, and standing prolonged periods.  Physical Therapy will continue to follow patient for duration of hospitalization.    Patient continues to benefit from continued skilled PT services: at discharge to promote prior level of function and safety with additional support and return home with home health PT.    PLAN DURING HOSPITALIZATION  Nursing Mobility Recommendation : 1 Assist  PT Device Recommendation: Rolling walker, Gait belt  PT Treatment Plan: Coordination, Endurance, Gait training, Patient education, Transfer training, Balance training, Stair training  Frequency (Obs): Daily     SUBJECTIVE  I feel much better now.     OBJECTIVE  Precautions: Limb alert - left    WEIGHT BEARING STATUS  L Lower Extremity: Weight Bearing as Tolerated      PAIN ASSESSMENT   Ratin  Location: left knee  Management Techniques: Activity promotion, Breathing techniques, Repositioning    BALANCE  Static Sitting:  Good  Dynamic Sitting: Fair +  Static Standing: Fair +  Dynamic Standing: Fair    ACTIVITY TOLERANCE  Pulse: 82  Heart Rate Source: Monitor  Resp: 19  BP: 127/67  BP Location: Left arm  BP Method: Automatic  Patient Position: Sitting    O2 WALK  Oxygen Therapy  SPO2% on Room Air at Rest: 95    AM-PAC '6-Clicks' INPATIENT SHORT FORM - BASIC MOBILITY  How much difficulty does the patient currently have...  Patient Difficulty: Turning over in bed (including adjusting bedclothes, sheets and blankets)?: A Little   Patient Difficulty: Sitting down on and standing up from a chair with arms (e.g., wheelchair, bedside commode, etc.): None   Patient Difficulty: Moving from lying on back to sitting on the side of the bed?: A Little   How much help from another person does the patient currently need...   Help from Another: Moving to and from a bed to a chair (including a wheelchair)?: None   Help from Another: Need to walk in hospital room?: A Little   Help from Another: Climbing 3-5 steps with a railing?: A Little     AM-PAC Score:  Raw Score: 20   Approx Degree of Impairment: 35.83%   Standardized Score (AM-PAC Scale): 47.67   CMS Modifier (G-Code): CJ    FUNCTIONAL ABILITY STATUS  Functional Mobility/Gait Assessment  Gait Assistance: Contact guard assist  Distance (ft): 300  Assistive Device: Rolling walker  Pattern: L Decreased stance time  Stairs: Stairs  How Many Stairs: 4  Device: 2 Rails  Assist: Minimal assist  Pattern: Ascend and Descend  Ascend and Descend : Step to  Rolling:  NT  Supine to Sit: minimal assist  Sit to Supine: contact guard assist and minimal assist  Sit to Stand: contact guard assist    Skilled Therapy Provided: chart reviewed and VIKI Lindquist approved session. Patient in bed agreeable to participate with therapy activity. Patient improved bed mobility with cues d/t left shld pain/weakness. Transfers with RW safely, amb with RW on hallway with supervision with good tolerance. Patient navigate stairs  with Min A with cues for safety.     The patient's Approx Degree of Impairment: 35.83% has been calculated based on documentation in the [facility] '6 clicks' Inpatient Basic Mobility Short Form.  Research supports that patients with this level of impairment may benefit from  PT.  Final disposition will be made by interdisciplinary medical team.    Exercises AM Session    Ankle Pumps 25 reps    Quad Sets 12 reps    Glut Sets  reps    Hip Abd/Add 10 reps    Heel slides 15 reps    Saq  reps    SLR 5 reps    Sitting Knee Flexion 25 reps    Standing heel/toe raises  reps    Standing knee flexion  reps    Extension stretch  1x      Comments: Pt participated in group session, tolerance was .    Knee ROM       0/97 deg flex on Left knee          Patient End of Session: Up in chair, Hospital anti-slip socks, Bracing education provided per handout, Call light within reach, RN aware of session/findings, Needs met    CURRENT GOALS  Goals to be met by: 25  Patient Goal Patient's self-stated goal is: home with family    Goal #1 Patient is able to demonstrate supine - sit EOB @ level: supervision     Goal #1   Current Status CGA/Min A   Goal #2 Patient is able to demonstrate transfers EOB to/from Chair/Wheelchair at assistance level: supervision with walker - rolling     Goal #2  Current Status SBA   Goal #3 Patient is able to ambulate 150  feet with assist device: walker - rolling at assistance level: CGA   Goal #3   Current Status SBA  ft   Goal #4 Patient will negotiate 0-4 stairs/one curb w/ assistive device min assist    Goal #4   Current Status 4 stairs with 2 HR CGA   Goal #5 Patient to demonstrate independence with home activity/exercise instructions provided to patient in preparation for discharge.   Goal #5   Current Status In progress   Goal #6    Goal #6  Current Status      Gait Trainin minutes  Therapeutic Activity: 20 minutes  Neuromuscular Re-education:  minutes  Therapeutic Exercise:   minutes  Canalith Repositioning: minutes  Manual Therapy:  minutes  Can add/delete any of these

## 2025-05-10 NOTE — OCCUPATIONAL THERAPY NOTE
OCCUPATIONAL THERAPY EVALUATION - INPATIENT     Room Number: Room 2/Room 2-A  Evaluation Date: 5/10/2025  Type of Evaluation: Initial       Physician Order: IP Consult to Occupational Therapy  Reason for Therapy: ADL/IADL Dysfunction and Discharge Planning    OCCUPATIONAL THERAPY ASSESSMENT   Patient is a 80 year old male admitted 2025 for Left TKA, WBAT.  Prior to admission, patient's baseline is independent with ADLs, ambulates comm distances with a SPC.  Patient is currently functioning below baseline with  self care/ALs .  Patient is requiring contact guard assist and minimal assist as a result of the following impairments: decreased functional strength, pain, and limited knee ROM.    PLAN DURING HOSPITALIZATION  OT Device Recommendations: Reacher, Sock aid, Long-handled sponge, Shower chair, Grab bars        OCCUPATIONAL THERAPY MEDICAL/SOCIAL HISTORY   Problem List   Principal Problem:    Primary osteoarthritis of left knee  Active Problems:    Essential hypertension    Hyperlipidemia    Diabetes type 2 (HCC)    BPH (benign prostatic hyperplasia)    HOME SITUATION  Type of Home: House  Home Layout: Two level; Able to live on main level  Lives With: Spouse  Drives: Yes  Patient Regularly Uses: Cane; Rolling walker    Stairs in Home: 1 [address], 2 steps to kitchen  Assistive Device(s) Used: SPC     Prior Level of [place]: Pt lives with his wife in a 2 level home. He can stay on the main level. He is ind with ADLs. He has a walk in shower    SUBJECTIVE  \"I am ready to go \"    OCCUPATIONAL THERAPY EXAMINATION   OBJECTIVE  Precautions: Limb alert - left  Fall Risk: Standard fall risk    WEIGHT BEARING RESTRICTION  L Lower Extremity: Weight Bearing as Tolerated    PAIN ASSESSMENT  Ratin  Location: left knee  Management Techniques: Relaxation; Repositioning; Ice    ACTIVITY TOLERANCE                         O2 SATURATIONS       COGNITION  Overall Cognitive Status:  WFL - within functional limits    RANGE OF  MOTION   Upper extremity ROM is within functional limits     STRENGTH ASSESSMENT  Upper extremity strength is within functional limits     ACTIVITIES OF DAILY LIVING ASSESSMENT  AM-PAC ‘6-Clicks’ Inpatient Daily Activity Short Form  How much help from another person does the patient currently need…  -   Putting on and taking off regular lower body clothing?: A Little  -   Bathing (including washing, rinsing, drying)?: A Little  -   Toileting, which includes using toilet, bedpan or urinal? : A Little  -   Putting on and taking off regular upper body clothing?: A Little  -   Taking care of personal grooming such as brushing teeth?: None  -   Eating meals?: None    AM-PAC Score:  Score: 20  Approx Degree of Impairment: 38.32%  Standardized Score (AM-PAC Scale): 42.03  CMS Modifier (G-Code): ANNEMARIE    FUNCTIONAL TRANSFER ASSESSMENT  Sit to Stand: Chair  Chair: Stand-by Assist  Toilet Transfer: Stand-by Assist    BED MOBILITY     BALANCE ASSESSMENT  Static Sitting: Supervision  Static Standing: Stand-by Assist    FUNCTIONAL ADL ASSESSMENT  Eating: Independent  Grooming Standing: Stand-by Assist  Bathing Seated: Contact Guard Assist  UB Dressing Seated: Stand-by Assist  LB Dressing Seated: Contact Guard Assist  Toileting Standing: Contact Guard Assist    THERAPEUTIC EXERCISE     Skilled Therapy Provided: RN approved session. Pt received in the chair, agreeable to tx. Pt reports \"2/10\" pain in his left knee. Pt is at SBA to CGA for functional transfers, LB dressing tasks, toileting tasks. He needs occasional cues to slow down. Pt was instructed in use of LHAE for LB dressing. No ongoing skilled acute OT needs. Will sign off. Anticipate DC home today.      EDUCATION PROVIDED  Patient Education : Role of Occupational Therapy; Plan of Care; Discharge Recommendations; Functional Transfer Techniques  Patient's Response to Education: Verbalized Understanding    The patient's Approx Degree of Impairment: 38.32% has been calculated  based on documentation in the [facility] '6 clicks' Inpatient Daily Activity Short Form.  Research supports that patients with this level of impairment may benefit from home with HH.  Final disposition will be made by interdisciplinary medical team.    Patient End of Session: Up in chair, Needs met, Call light within reach, RN aware of session/findings, All patient questions and concerns addressed, Hospital anti-slip socks      Frequency:    Patient Evaluation Complexity Level:   Occupational Profile/Medical History LOW - Brief history including review of medical or therapy records    Specific performance deficits impacting engagement in ADL/IADL LOW  1 - 3 performance deficits    Client Assessment/Performance Deficits LOW - No comorbidities nor modifications of tasks    Clinical Decision Making LOW - Analysis of occupational profile, problem-focused assessments, limited treatment options    Overall Complexity LOW     OT Session     Therapeutic Activity: 15 minutes

## 2025-05-17 ENCOUNTER — HOSPITAL ENCOUNTER (INPATIENT)
Facility: HOSPITAL | Age: 80
LOS: 2 days | Discharge: HOME HEALTH | End: 2025-05-19
Attending: EMERGENCY MEDICINE | Admitting: HOSPITALIST
Payer: MEDICARE

## 2025-05-17 ENCOUNTER — APPOINTMENT (OUTPATIENT)
Dept: NUCLEAR MEDICINE | Facility: HOSPITAL | Age: 80
End: 2025-05-17
Attending: EMERGENCY MEDICINE
Payer: MEDICARE

## 2025-05-17 ENCOUNTER — APPOINTMENT (OUTPATIENT)
Dept: ULTRASOUND IMAGING | Facility: HOSPITAL | Age: 80
End: 2025-05-17
Attending: EMERGENCY MEDICINE
Payer: MEDICARE

## 2025-05-17 ENCOUNTER — APPOINTMENT (OUTPATIENT)
Dept: GENERAL RADIOLOGY | Facility: HOSPITAL | Age: 80
End: 2025-05-17
Attending: EMERGENCY MEDICINE
Payer: MEDICARE

## 2025-05-17 DIAGNOSIS — N28.9 ACUTE RENAL INSUFFICIENCY: Primary | ICD-10-CM

## 2025-05-17 DIAGNOSIS — I82.431 ACUTE DEEP VEIN THROMBOSIS (DVT) OF POPLITEAL VEIN OF RIGHT LOWER EXTREMITY (HCC): ICD-10-CM

## 2025-05-17 DIAGNOSIS — R33.9 URINARY RETENTION: ICD-10-CM

## 2025-05-17 DIAGNOSIS — E87.5 HYPERKALEMIA: ICD-10-CM

## 2025-05-17 LAB
ALBUMIN SERPL-MCNC: 4.2 G/DL (ref 3.2–4.8)
ALBUMIN/GLOB SERPL: 1.6 {RATIO} (ref 1–2)
ALP LIVER SERPL-CCNC: 74 U/L (ref 45–117)
ALT SERPL-CCNC: 19 U/L (ref 10–49)
ANION GAP SERPL CALC-SCNC: 11 MMOL/L (ref 0–18)
ANION GAP SERPL CALC-SCNC: 9 MMOL/L (ref 0–18)
APTT PPP: 31 SECONDS (ref 23–36)
APTT PPP: 60.8 SECONDS (ref 23–36)
AST SERPL-CCNC: 34 U/L (ref ?–34)
BASE EXCESS BLD CALC-SCNC: -7.4 MMOL/L (ref ?–2)
BASOPHILS # BLD AUTO: 0.04 X10(3) UL (ref 0–0.2)
BASOPHILS NFR BLD AUTO: 0.4 %
BILIRUB SERPL-MCNC: 0.6 MG/DL (ref 0.2–1.1)
BILIRUB UR QL: NEGATIVE
BUN BLD-MCNC: 54 MG/DL (ref 9–23)
BUN BLD-MCNC: 80 MG/DL (ref 9–23)
BUN/CREAT SERPL: 13.6 (ref 10–20)
BUN/CREAT SERPL: 17.8 (ref 10–20)
CA-I BLD-SCNC: 1.21 MMOL/L (ref 0.95–1.32)
CALCIUM BLD-MCNC: 8.8 MG/DL (ref 8.7–10.4)
CALCIUM BLD-MCNC: 8.8 MG/DL (ref 8.7–10.4)
CHLORIDE SERPL-SCNC: 102 MMOL/L (ref 98–112)
CHLORIDE SERPL-SCNC: 107 MMOL/L (ref 98–112)
CLARITY UR: CLEAR
CO2 SERPL-SCNC: 19 MMOL/L (ref 21–32)
CO2 SERPL-SCNC: 22 MMOL/L (ref 21–32)
COHGB MFR BLD: 2.5 % (ref 0–3)
COLOR UR: COLORLESS
CREAT BLD-MCNC: 3.04 MG/DL (ref 0.7–1.3)
CREAT BLD-MCNC: 5.9 MG/DL (ref 0.7–1.3)
DEPRECATED RDW RBC AUTO: 45.6 FL (ref 35.1–46.3)
EGFRCR SERPLBLD CKD-EPI 2021: 20 ML/MIN/1.73M2 (ref 60–?)
EGFRCR SERPLBLD CKD-EPI 2021: 9 ML/MIN/1.73M2 (ref 60–?)
EOSINOPHIL # BLD AUTO: 0.19 X10(3) UL (ref 0–0.7)
EOSINOPHIL NFR BLD AUTO: 2.1 %
ERYTHROCYTE [DISTWIDTH] IN BLOOD BY AUTOMATED COUNT: 13.1 % (ref 11–15)
GLOBULIN PLAS-MCNC: 2.6 G/DL (ref 2–3.5)
GLUCOSE BLD-MCNC: 106 MG/DL (ref 70–99)
GLUCOSE BLD-MCNC: 120 MG/DL (ref 70–99)
GLUCOSE BLDC GLUCOMTR-MCNC: 104 MG/DL (ref 70–99)
GLUCOSE BLDC GLUCOMTR-MCNC: 107 MG/DL (ref 70–99)
GLUCOSE BLDC GLUCOMTR-MCNC: 116 MG/DL (ref 70–99)
GLUCOSE BLDC GLUCOMTR-MCNC: 133 MG/DL (ref 70–99)
GLUCOSE BLDC GLUCOMTR-MCNC: 98 MG/DL (ref 70–99)
GLUCOSE UR-MCNC: NORMAL MG/DL
HCO3 BLDA-SCNC: 19 MEQ/L (ref 21–27)
HCT VFR BLD AUTO: 37.1 % (ref 39–53)
HGB BLD-MCNC: 12.1 G/DL (ref 13–17.5)
HGB BLD-MCNC: 13.4 G/DL (ref 13–17.5)
IMM GRANULOCYTES # BLD AUTO: 0.03 X10(3) UL (ref 0–1)
IMM GRANULOCYTES NFR BLD: 0.3 %
KETONES UR-MCNC: NEGATIVE MG/DL
LACTATE BLD-SCNC: 1.1 MMOL/L (ref 0.5–2)
LEUKOCYTE ESTERASE UR QL STRIP.AUTO: 75
LIPASE SERPL-CCNC: 71 U/L (ref 12–53)
LYMPHOCYTES # BLD AUTO: 0.78 X10(3) UL (ref 1–4)
LYMPHOCYTES NFR BLD AUTO: 8.7 %
MAGNESIUM SERPL-MCNC: 2.5 MG/DL (ref 1.6–2.6)
MCH RBC QN AUTO: 30.7 PG (ref 26–34)
MCHC RBC AUTO-ENTMCNC: 32.6 G/DL (ref 31–37)
MCV RBC AUTO: 94.2 FL (ref 80–100)
METHGB MFR BLD: 1.7 % SAT (ref 0.4–1.5)
MODIFIED ALLEN TEST: POSITIVE
MONOCYTES # BLD AUTO: 1.41 X10(3) UL (ref 0.1–1)
MONOCYTES NFR BLD AUTO: 15.6 %
NEUTROPHILS # BLD AUTO: 6.56 X10 (3) UL (ref 1.5–7.7)
NEUTROPHILS # BLD AUTO: 6.56 X10(3) UL (ref 1.5–7.7)
NEUTROPHILS NFR BLD AUTO: 72.9 %
NITRITE UR QL STRIP.AUTO: NEGATIVE
O2 CT BLD-SCNC: 17.6 VOL% (ref 15–23)
OSMOLALITY SERPL CALC.SUM OF ELEC: 299 MOSM/KG (ref 275–295)
OSMOLALITY SERPL CALC.SUM OF ELEC: 301 MOSM/KG (ref 275–295)
PCO2 BLDA: 32 MM HG (ref 35–45)
PH BLDA: 7.34 [PH] (ref 7.35–7.45)
PH UR: 5.5 [PH] (ref 5–8)
PLATELET # BLD AUTO: 274 10(3)UL (ref 150–450)
PO2 BLDA: 81 MM HG (ref 80–100)
POTASSIUM BLD-SCNC: 6.4 MMOL/L (ref 3.6–5.1)
POTASSIUM SERPL-SCNC: 5.8 MMOL/L (ref 3.5–5.1)
POTASSIUM SERPL-SCNC: 6.1 MMOL/L (ref 3.5–5.1)
PROT SERPL-MCNC: 6.8 G/DL (ref 5.7–8.2)
PROT UR-MCNC: NEGATIVE MG/DL
PUNCTURE CHARGE: YES
RBC # BLD AUTO: 3.94 X10(6)UL (ref 3.8–5.8)
RBC #/AREA URNS AUTO: >10 /HPF
SAO2 % BLDA: 97 % (ref 94–100)
SODIUM BLD-SCNC: 130 MMOL/L (ref 135–145)
SODIUM SERPL-SCNC: 132 MMOL/L (ref 136–145)
SODIUM SERPL-SCNC: 138 MMOL/L (ref 136–145)
SP GR UR STRIP: 1.01 (ref 1–1.03)
UROBILINOGEN UR STRIP-ACNC: NORMAL
WBC # BLD AUTO: 9 X10(3) UL (ref 4–11)

## 2025-05-17 PROCEDURE — 99223 1ST HOSP IP/OBS HIGH 75: CPT | Performed by: HOSPITALIST

## 2025-05-17 PROCEDURE — 71045 X-RAY EXAM CHEST 1 VIEW: CPT | Performed by: EMERGENCY MEDICINE

## 2025-05-17 PROCEDURE — 78582 LUNG VENTILAT&PERFUS IMAGING: CPT | Performed by: EMERGENCY MEDICINE

## 2025-05-17 PROCEDURE — 93970 EXTREMITY STUDY: CPT | Performed by: EMERGENCY MEDICINE

## 2025-05-17 RX ORDER — FINASTERIDE 5 MG/1
5 TABLET, FILM COATED ORAL DAILY
Status: DISCONTINUED | OUTPATIENT
Start: 2025-05-17 | End: 2025-05-19

## 2025-05-17 RX ORDER — ACETAMINOPHEN 500 MG
500 TABLET ORAL EVERY 4 HOURS PRN
Status: DISCONTINUED | OUTPATIENT
Start: 2025-05-17 | End: 2025-05-19

## 2025-05-17 RX ORDER — SENNOSIDES 8.6 MG
17.2 TABLET ORAL NIGHTLY PRN
Status: DISCONTINUED | OUTPATIENT
Start: 2025-05-17 | End: 2025-05-19

## 2025-05-17 RX ORDER — PANTOPRAZOLE SODIUM 40 MG/1
40 TABLET, DELAYED RELEASE ORAL
Status: DISCONTINUED | OUTPATIENT
Start: 2025-05-17 | End: 2025-05-19

## 2025-05-17 RX ORDER — TRAMADOL HYDROCHLORIDE 50 MG/1
50 TABLET ORAL EVERY 12 HOURS PRN
Status: DISCONTINUED | OUTPATIENT
Start: 2025-05-17 | End: 2025-05-19

## 2025-05-17 RX ORDER — POLYETHYLENE GLYCOL 3350 17 G/17G
17 POWDER, FOR SOLUTION ORAL DAILY
Status: DISCONTINUED | OUTPATIENT
Start: 2025-05-18 | End: 2025-05-19

## 2025-05-17 RX ORDER — ASPIRIN 81 MG/1
81 TABLET ORAL DAILY
COMMUNITY
End: 2025-05-19

## 2025-05-17 RX ORDER — ONDANSETRON 2 MG/ML
4 INJECTION INTRAMUSCULAR; INTRAVENOUS EVERY 6 HOURS PRN
Status: DISCONTINUED | OUTPATIENT
Start: 2025-05-17 | End: 2025-05-19

## 2025-05-17 RX ORDER — ALBUTEROL SULFATE 5 MG/ML
10 SOLUTION RESPIRATORY (INHALATION) ONCE
Status: COMPLETED | OUTPATIENT
Start: 2025-05-17 | End: 2025-05-17

## 2025-05-17 RX ORDER — NICOTINE POLACRILEX 4 MG
15 LOZENGE BUCCAL
Status: DISCONTINUED | OUTPATIENT
Start: 2025-05-17 | End: 2025-05-19

## 2025-05-17 RX ORDER — HEPARIN SODIUM 1000 [USP'U]/ML
80 INJECTION, SOLUTION INTRAVENOUS; SUBCUTANEOUS ONCE
Status: COMPLETED | OUTPATIENT
Start: 2025-05-17 | End: 2025-05-17

## 2025-05-17 RX ORDER — DEXTROSE MONOHYDRATE 25 G/50ML
50 INJECTION, SOLUTION INTRAVENOUS
Status: DISCONTINUED | OUTPATIENT
Start: 2025-05-17 | End: 2025-05-19

## 2025-05-17 RX ORDER — TAMSULOSIN HYDROCHLORIDE 0.4 MG/1
0.4 CAPSULE ORAL NIGHTLY
Status: DISCONTINUED | OUTPATIENT
Start: 2025-05-17 | End: 2025-05-19

## 2025-05-17 RX ORDER — CALCIUM CARBONATE 500 MG/1
1000 TABLET, CHEWABLE ORAL 3 TIMES DAILY PRN
Status: DISCONTINUED | OUTPATIENT
Start: 2025-05-17 | End: 2025-05-19

## 2025-05-17 RX ORDER — CHOLECALCIFEROL (VITAMIN D3) 25 MCG
1000 TABLET ORAL DAILY
Status: DISCONTINUED | OUTPATIENT
Start: 2025-05-18 | End: 2025-05-19

## 2025-05-17 RX ORDER — DEXTROSE MONOHYDRATE 25 G/50ML
50 INJECTION, SOLUTION INTRAVENOUS ONCE
Status: COMPLETED | OUTPATIENT
Start: 2025-05-17 | End: 2025-05-17

## 2025-05-17 RX ORDER — HEPARIN SODIUM AND DEXTROSE 10000; 5 [USP'U]/100ML; G/100ML
INJECTION INTRAVENOUS CONTINUOUS
Status: DISCONTINUED | OUTPATIENT
Start: 2025-05-17 | End: 2025-05-19

## 2025-05-17 RX ORDER — HEPARIN SODIUM AND DEXTROSE 10000; 5 [USP'U]/100ML; G/100ML
18 INJECTION INTRAVENOUS ONCE
Status: COMPLETED | OUTPATIENT
Start: 2025-05-17 | End: 2025-05-17

## 2025-05-17 RX ORDER — NICOTINE POLACRILEX 4 MG
30 LOZENGE BUCCAL
Status: DISCONTINUED | OUTPATIENT
Start: 2025-05-17 | End: 2025-05-19

## 2025-05-17 RX ORDER — ATORVASTATIN CALCIUM 20 MG/1
20 TABLET, FILM COATED ORAL DAILY
Status: DISCONTINUED | OUTPATIENT
Start: 2025-05-18 | End: 2025-05-19

## 2025-05-17 NOTE — CONSULTS
[facility]  [facility] Consult Note    Fransisco Nielsenjani Sr. Patient Status:  Inpatient    1945 MRN [id number]   Location [facility] 3W/SW Attending Woo Acuña MD   Hosp Day # 0 PCP Tressa Bowman MD     Consulting Physician:  Woo Acuña MD    Fransisco David Sr. is a(n) 80 year old male presenting with:  Chief Complaint   Patient presents with    Abdomen/Flank Pain       SUBJECTIVE:   Reason for Urology Consult: Urinary Retention, Acute Renal Failure, BPH    80 year old male who presented to the ED with complaints of abdominal pain and increased urinary frequency.  He is s/p left TKA on 25.    In the ED, he was noted to have acute renal failure and a mello catheter was placed with return of 2 liters of urine.  He was also noted to have a DVT in the lower extremity.    His daughter at bedside endorses a the patient having a previous history of difficulties urinating after a spine surgery but did not need a mello.  He denies any constipation and is ambulating as expected.  He has been taking Tramadol and Norco for his pain.    He endorses a urologic history significant for BPH and sees Dr. Noriega at [facility].  He also endorses a history of a renal cyst that is being monitored with serial imaging.    Past Medical History[1]  Past Surgical History[2]   Family History[3]  Allergies[4]   Social History:  Short Social Hx on File[5]     MEDICATIONS:   Prescriptions Prior to Admission[6]  Current Hospital Medications[7]  Current Medications[8]    REVIEW OF SYSTEMS:   Review of Systems   Constitutional: Negative.    HENT:  Negative.     Eyes: Negative.    Respiratory: Negative.     Cardiovascular: Negative.    Gastrointestinal: Negative.    Endocrine: Negative.    Genitourinary:          Per HPI   Musculoskeletal: Negative.    Skin: Negative.    Neurological: Negative.    Hematological: Negative.    Psychiatric/Behavioral: Negative.         EXAM:     Vital 24 Hour  Range   Temperature Temp  Min: 97.7 °F (36.5 °C)  Max: 98 °F (36.7 °C)   Pulse Pulse  Min: 82  Max: 113   Respiratory Resp  Min: 15  Max: 22   Non-Invasive  Blood Pressure BP  Min: 96/78  Max: 152/74   Pulse Oximetry SpO2  Min: 95 %  Max: 100 %     /63 (BP Location: Right arm)   Pulse 101   Temp 97.7 °F (36.5 °C) (Axillary)   Resp 20   Wt 204 lb 12.9 oz (92.9 kg)   SpO2 96%   BMI 32.08 kg/m²   Body mass index is 32.08 kg/m².  Physical Exam  Constitutional:       Appearance: Normal appearance.   HENT:      Head: Normocephalic and atraumatic.   Cardiovascular:      Rate and Rhythm: Normal rate.      Pulses: Normal pulses.   Pulmonary:      Effort: Pulmonary effort is normal. No respiratory distress.   Abdominal:      General: Abdomen is flat.      Palpations: Abdomen is soft.   Genitourinary:     Comments: Catheter in, draining clear urine  Skin:     General: Skin is warm and dry.   Neurological:      Mental Status: He is alert and oriented to person, place, and time. Mental status is at baseline.   Psychiatric:         Mood and Affect: Mood normal.         Behavior: Behavior normal.         I/Os:       Intake/Output Summary (Last 24 hours) at 5/17/2025 1323  Last data filed at 5/17/2025 1228  Gross per 24 hour   Intake --   Output 4450 ml   Net -4450 ml        IMAGING:     NM LUNG VQ VENT / PERFUSION SCAN  (CPT=78582)   Final Result   PROCEDURE: NM LUNG VQ/PERFUSION SCAN (CPT=78582)       COMPARISON: None.       INDICATIONS: eval for PE       TECHNIQUE: Ventilation/perfusion lung study done with 7 millicuries    Xenon-133 (inhaled), followed by 8.4 millicuries of Technetium-99m MAA    injected into the left antecubital vein.         FINDINGS:    PERFUSION: There are small to moderate-sized perfusion defects at the    lateral aspect of the right upper lung.  There are no other perfusion    defects.       VENTILATION - SINGLE BREATH: Normal homogeneous isotope distribution.     VENTILATION EQUILIBRIUM AND  WASHOUT IMAGES: Normal.         V/Q MISMATCH: There is normal ventilation in the region of the perfusion    defects.       Comparison is made with a chest X-ray the perfusion defects correspond to    the areas of pleural plaque seen on radiograph of same date.                   =====   CONCLUSION: Low probability for pulmonary embolus.  Per small to    moderate-sized perfusion defects at the lateral aspect of the right    upper/mid lung correspond to pleural plaques in this region.  There are no    other perfusion defects clearly demonstrated.     There are no ventilatory defects.                 Dictated by (CST): Tho Montoya MD on 5/17/2025 at 11:47 AM        Finalized by (CST): Tho Montoya MD on 5/17/2025 at 11:52 AM               US VENOUS DOPPLER LEG BILAT - DIAG IMG (CPT=93970)   Final Result   PROCEDURE: US VENOUS DOPPLER LEG BILAT-DIAG IMG (CPT=93970)       COMPARISON: None.       INDICATIONS: eval for DVT       TECHNIQUE: Color duplex Doppler venous ultrasound of both lower    extremities was performed in the    usual manner.       FINDINGS:   There is acute appearing expansile occlusive or near occlusive    DVT within the distal left popliteal vein and 1 of 2 left peroneal veins.     There is no extension into the mid/proximal popliteal vein.  The remaining    deep veins of the left lower    extremity demonstrate normal blood flow, compressibility, and    augmentation.       There is normal blood flow, compressibility, and augmentation of the deep    veins of the right lower extremity.                   =====   CONCLUSION:        1. Acute appearing expansile near occlusive DVT within the distal left    popliteal vein and 1 of 2 left peroneal veins.  No other DVT is identified    within the left lower extremity.       2. No sonographic evidence of right lower extremity DVT.               Dictated by (CST): Tho Montoya MD on 5/17/2025 at 9:58 AM        Finalized by (CST): Tho Montoya MD  on 5/17/2025 at 10:00 AM               XR CHEST AP PORTABLE  (CPT=71045)   Final Result   PROCEDURE: XR CHEST AP PORTABLE  (CPT=71045)   TIME: 841.         COMPARISON: [facility], XR CHEST PA + LAT CHEST    (CPT=71046), 4/29/2025, 10:14 AM.       INDICATIONS: Shortness of breath and abdominal pain.       TECHNIQUE:   Single view.         FINDINGS:    CARDIAC/VASC: Normal.  No cardiac silhouette abnormality or cardiomegaly.     Unremarkable pulmonary vasculature.    MEDIAST/LATASHA:   No visible mass or adenopathy.   LUNGS/PLEURA: There is redemonstration of calcified pleural plaques in the    lateral and superior aspect of the right chest wall.  There is minimal    atelectasis at the left lung base.  The lungs are otherwise clear.   BONES: Moderate degenerative spurring throughout the thoracic spine.   OTHER: Negative.                     =====   CONCLUSION: No acute cardiopulmonary process clearly visualized.               Dictated by (CST): Tho Montoya MD on 5/17/2025 at 8:53 AM        Finalized by (CST): Tho Montoya MD on 5/17/2025 at 8:56 AM                   ISeymour DO, have interpreted the patient's imaging independently which is significant for no acute urologic issues, though he is noted to have a left lower extremity DVT.  LABS:   CBC  Recent Labs   Lab 05/17/25  0824   RBC 3.94   HGB 12.1*   HCT 37.1*   MCV 94.2   MCH 30.7   MCHC 32.6   RDW 13.1   NEPRELIM 6.56   WBC 9.0   .0        BMP  Recent Labs   Lab 05/17/25  0915   *   BUN 80*   CREATSERUM 5.90*   EGFRCR 9*   CA 8.8   *   K 6.1*      CO2 19.0*        Urinalysis/Cultures  Recent Labs   Lab 05/17/25  0850   COLORUR Colorless*   CLARITY Clear   SPECGRAVITY 1.008   GLUUR Normal   BILUR Negative   KETUR Negative   BLOODURINE 2+*   PHURINE 5.5   PROUR Negative   UROBILINOGEN Normal   NITRITE Negative   LEUUR 75*   WBCUR 1-5   RBCUR >10*   BACUR None Seen   EPIUR Few*        No results  found for the last 90 days.      I, Seymour Ennis DO, have independently reviewed the patient's labs.  ASSESSMENT AND PLAN:   Impression:  Problem List[9]    Fransisco David . is a(n) 80 year old male presenting with:    Urinary Retention  Acute Renal Failure  BPH  Renal Cyst    - Diaz placed in the ED with 2 liters returned, would monitor urine output for possible post-obstructive diuresis treatment  - Nephrology recommendations pending, baseline creatinine noted to be <1 on his BMP from 25  - On Flomax for previous history of BPH, will start on Finasteride now given his history of urinary retention  - No acute interventions for renal cyst as it appears to be followed with serial imaging on an outpatient setting    Seymour Ennis DO  [facility]  O:  [phone]  2025  1:23 PM         [1]   Past Medical History:   Back problem    CAD (coronary artery disease)    Cancer (HCC)    skin    Cataract    Diabetes (HCC)    Diabetes type 2 (HCC)    High blood pressure    High cholesterol    History of colon polyps    HTN (hypertension)    Hyperlipidemia    Osteoarthritis    Visual impairment    reading glasses   [2]   Past Surgical History:  Procedure Laterality Date    Anesth,lower arm surgery Right     wrist surgery    Cataract Bilateral     Colonoscopy      Hip replacement surgery Right     Skin surgery      skin cancer removed from face and chest    Spine surgery procedure unlisted      Total knee replacement Right    [3]   Family History  Problem Relation Age of Onset    Other (Other) Father         cirrohsis    Cancer Mother         liver    Other (Other) Brother         MI   [4]   Allergies  Allergen Reactions    Neomycin-Bacitracin-Polymyxin RASH     With regular use   [5]   Social History  Socioeconomic History    Marital status:    Tobacco Use    Smoking status: Former     Types: Cigarettes     Start date:      Quit date:      Years since quittin.4     Passive  exposure: Past    Smokeless tobacco: Never   Vaping Use    Vaping status: Never Used   Substance and Sexual Activity    Alcohol use: Yes     Comment: socially    Drug use: Never   [6]   Medications Prior to Admission   Medication Sig Dispense Refill Last Dose/Taking    aspirin 81 MG Oral Tab EC Take 1 tablet (81 mg total) by mouth daily.   5/17/2025 Morning    atorvastatin 20 MG Oral Tab Take 1 tablet (20 mg total) by mouth in the morning.   5/17/2025 Morning    ramipril 10 MG Oral Cap Take 1 capsule (10 mg total) by mouth in the evening.   5/16/2025 Evening    metFORMIN 500 MG Oral Tab Take 1 tablet (500 mg total) by mouth 2 (two) times daily with meals.   5/17/2025 Morning    tamsulosin 0.4 MG Oral Cap Take 1 capsule (0.4 mg total) by mouth at bedtime.   5/16/2025 Evening    Multiple Vitamin (ONE-DAILY MULTI VITAMINS) Oral Tab Take 1 tablet by mouth daily.   5/17/2025 Morning    polyethylene glycol, PEG 3350, 17 g Oral Powd Pack Take 17 g by mouth in the morning.       Cholecalciferol (VITAMIN D3 OR) Take 1,000 Units by mouth daily.      [7]   Current Facility-Administered Medications:     heparin (Porcine) 34564 units/250 mL infusion ED (PE/DVT/THROMBUS) INITIAL DOSE, 18 Units/kg/hr, Intravenous, Once    heparin (Porcine) 70581 units/250mL infusion PE/DVT/THROMBUS CONTINUOUS, 200-3,000 Units/hr, Intravenous, Continuous    [START ON 5/18/2025] atorvastatin (Lipitor) tab 20 mg, 20 mg, Oral, Daily    [START ON 5/18/2025] cholecalciferol (Vitamin D3) tab 1,000 Units, 1,000 Units, Oral, Daily    [START ON 5/18/2025] polyethylene glycol (PEG 3350) (Miralax) 17 g oral packet 17 g, 17 g, Oral, Daily    tamsulosin (Flomax) cap 0.4 mg, 0.4 mg, Oral, Nightly    glucose (Dex4) 15 GM/59ML oral liquid 15 g, 15 g, Oral, Q15 Min PRN **OR** glucose (Glutose) 40% oral gel 15 g, 15 g, Oral, Q15 Min PRN **OR** glucose-vitamin C (Dex-4) chewable tab 4 tablet, 4 tablet, Oral, Q15 Min PRN **OR** dextrose 50% injection 50 mL, 50 mL,  Intravenous, Q15 Min PRN **OR** glucose (Dex4) 15 GM/59ML oral liquid 30 g, 30 g, Oral, Q15 Min PRN **OR** glucose (Glutose) 40% oral gel 30 g, 30 g, Oral, Q15 Min PRN **OR** glucose-vitamin C (Dex-4) chewable tab 8 tablet, 8 tablet, Oral, Q15 Min PRN    acetaminophen (Tylenol Extra Strength) tab 500 mg, 500 mg, Oral, Q4H PRN    sennosides (Senokot) tab 17.2 mg, 17.2 mg, Oral, Nightly PRN    ondansetron (Zofran) 4 MG/2ML injection 4 mg, 4 mg, Intravenous, Q6H PRN    insulin aspart (NovoLOG) 100 Units/mL FlexPen 1-7 Units, 1-7 Units, Subcutaneous, TID CC and HS    finasteride (Proscar) tab 5 mg, 5 mg, Oral, Daily  [8]   No current outpatient medications on file.   [9]   Patient Active Problem List  Diagnosis    Primary osteoarthritis of left knee    Essential hypertension    Hyperlipidemia    Diabetes type 2 (HCC)    BPH (benign prostatic hyperplasia)    Acute renal insufficiency    Hyperkalemia    Urinary retention    Acute deep vein thrombosis (DVT) of popliteal vein of right lower extremity (HCC)

## 2025-05-17 NOTE — ED QUICK NOTES
Rounding completed    No complaints at this time  Awaiting lab/imaging results  Elimination assistance offered  No additional needs at this time  Call light within reach, will update patient with more information  Will continue to monitor   Yes - the patient is able to be screened

## 2025-05-17 NOTE — PLAN OF CARE
Patient came from ED with difficulty urinating, abd pain, heartburn, dyspnea. Patient with L knee surgical dressing. Doppler confirmed DVT in R popliteal vein. Heparin gtt running.     Problem: CARDIOVASCULAR - ADULT  Goal: Maintains optimal cardiac output and hemodynamic stability  Description: INTERVENTIONS:  - Monitor vital signs, rhythm, and trends  - Monitor for bleeding, hypotension and signs of decreased cardiac output  - Evaluate effectiveness of vasoactive medications to optimize hemodynamic stability  - Monitor arterial and/or venous puncture sites for bleeding and/or hematoma  - Assess quality of pulses, skin color and temperature  - Assess for signs of decreased coronary artery perfusion - ex. Angina  - Evaluate fluid balance, assess for edema, trend weights  Outcome: Progressing  Goal: Absence of cardiac arrhythmias or at baseline  Description: INTERVENTIONS:  - Continuous cardiac monitoring, monitor vital signs, obtain 12 lead EKG if indicated  - Evaluate effectiveness of antiarrhythmic and heart rate control medications as ordered  - Initiate emergency measures for life threatening arrhythmias  - Monitor electrolytes and administer replacement therapy as ordered  Outcome: Progressing     Problem: RESPIRATORY - ADULT  Goal: Achieves optimal ventilation and oxygenation  Description: INTERVENTIONS:  - Assess for changes in respiratory status  - Assess for changes in mentation and behavior  - Position to facilitate oxygenation and minimize respiratory effort  - Oxygen supplementation based on oxygen saturation or ABGs  - Provide Smoking Cessation handout, if applicable  - Encourage broncho-pulmonary hygiene including cough, deep breathe, Incentive Spirometry  - Assess the need for suctioning and perform as needed  - Assess and instruct to report SOB or any respiratory difficulty  - Respiratory Therapy support as indicated  - Manage/alleviate anxiety  - Monitor for signs/symptoms of CO2 retention  Outcome:  Progressing     Problem: GASTROINTESTINAL - ADULT  Goal: Minimal or absence of nausea and vomiting  Description: INTERVENTIONS:  - Maintain adequate hydration with IV or PO as ordered and tolerated  - Nasogastric tube to low intermittent suction as ordered  - Evaluate effectiveness of ordered antiemetic medications  - Provide nonpharmacologic comfort measures as appropriate  - Advance diet as tolerated, if ordered  - Obtain nutritional consult as needed  - Evaluate fluid balance  Outcome: Progressing     Problem: GENITOURINARY - ADULT  Goal: Absence of urinary retention  Description: INTERVENTIONS:  - Assess patient’s ability to void and empty bladder  - Monitor intake/output and perform bladder scan as needed  - Follow urinary retention protocol/standard of care  - Consider collaborating with pharmacy to review patient's medication profile  - Implement strategies to promote bladder emptying  Outcome: Progressing     Problem: METABOLIC/FLUID AND ELECTROLYTES - ADULT  Goal: Glucose maintained within prescribed range  Description: INTERVENTIONS:  - Monitor Blood Glucose as ordered  - Assess for signs and symptoms of hyperglycemia and hypoglycemia  - Administer ordered medications to maintain glucose within target range  - Assess barriers to adequate nutritional intake and initiate nutrition consult as needed  - Instruct patient on self management of diabetes  Outcome: Progressing  Goal: Electrolytes maintained within normal limits  Description: INTERVENTIONS:  - Monitor labs and rhythm and assess patient for signs and symptoms of electrolyte imbalances  - Administer electrolyte replacement as ordered  - Monitor response to electrolyte replacements, including rhythm and repeat lab results as appropriate  - Fluid restriction as ordered  - Instruct patient on fluid and nutrition restrictions as appropriate  Outcome: Progressing  Goal: Hemodynamic stability and optimal renal function maintained  Description:  INTERVENTIONS:  - Monitor labs and assess for signs and symptoms of volume excess or deficit  - Monitor intake, output and patient weight  - Monitor urine specific gravity, serum osmolarity and serum sodium as indicated or ordered  - Monitor response to interventions for patient's volume status, including labs, urine output, blood pressure (other measures as available)  - Encourage oral intake as appropriate  - Instruct patient on fluid and nutrition restrictions as appropriate  Outcome: Progressing     Problem: MUSCULOSKELETAL - ADULT  Goal: Return mobility to safest level of function  Description: INTERVENTIONS:  - Assess patient stability and activity tolerance for standing, transferring and ambulating w/ or w/o assistive devices  - Assist with transfers and ambulation using safe patient handling equipment as needed  - Ensure adequate protection for wounds/incisions during mobilization  - Obtain PT/OT consults as needed  - Advance activity as appropriate  - Communicate ordered activity level and limitations with patient/family  Outcome: Progressing  Goal: Maintain proper alignment of affected body part  Description: INTERVENTIONS:  - Support and protect limb and body alignment per provider's orders  - Instruct and reinforce with patient and family use of appropriate assistive device and precautions (e.g. spinal or hip dislocation precautions)  Outcome: Progressing

## 2025-05-17 NOTE — ED PROVIDER NOTES
Patient Seen in: [facility] Emergency Department      History     Chief Complaint   Patient presents with    Abdomen/Flank Pain     Stated Complaint:     Subjective:   HPI  History of Present Illness            80-year-old male status post left TKA on May 9, 8 days ago, presents for evaluation for generalized weakness, fatigue, abdominal pain, increased urinary frequency, shortness of breath, acid reflux and right lower extremity swelling.  Family states that they had forgotten to give him his Protonix for a few days but did start that on Wednesday, 3 days ago.  For the past few days he has been having increased difficulty urinating with increased urinary frequency.  He has been taking Tylenol PM at night to help him sleep.  He has also been having epigastric burning and acid reflux symptoms.  He denies any chest pain but does endorse some shortness of breath.  He denies any fevers or chills.  He has been having normal bowel movements.      Objective:     Past Medical History:    Back problem    CAD (coronary artery disease)    Cancer (HCC)    skin    Cataract    Diabetes (HCC)    Diabetes type 2 (HCC)    High blood pressure    High cholesterol    History of colon polyps    HTN (hypertension)    Hyperlipidemia    Osteoarthritis    Visual impairment    reading glasses              Past Surgical History:   Procedure Laterality Date    Anesth,lower arm surgery Right     wrist surgery    Cataract Bilateral     Colonoscopy      Hip replacement surgery Right     Skin surgery      skin cancer removed from face and chest    Spine surgery procedure unlisted      Total knee replacement Right                 Social History     Socioeconomic History    Marital status:    Tobacco Use    Smoking status: Former     Types: Cigarettes     Start date:      Quit date:      Years since quittin.4     Passive exposure: Past    Smokeless tobacco: Never   Vaping Use    Vaping status: Never Used    Substance and Sexual Activity    Alcohol use: Yes     Comment: socially    Drug use: Never                                Physical Exam     ED Triage Vitals [05/17/25 0741]   /68   Pulse 102   Resp 18   Temp 98 °F (36.7 °C)   Temp src    SpO2 98 %   O2 Device None (Room air)       Current Vitals:   Vital Signs  BP: 96/78  Pulse: 112  Resp: 17  Temp: 98 °F (36.7 °C)  MAP (mmHg): 84    Oxygen Therapy  SpO2: 96 %  O2 Device: None (Room air)          Physical Exam  Vitals and nursing note reviewed.   Constitutional:       Appearance: Normal appearance.   HENT:      Head: Normocephalic and atraumatic.   Cardiovascular:      Rate and Rhythm: Normal rate and regular rhythm.      Pulses: Normal pulses.           Radial pulses are 2+ on the right side and 2+ on the left side.        Dorsalis pedis pulses are 2+ on the right side and 2+ on the left side.        Posterior tibial pulses are 2+ on the right side and 2+ on the left side.      Heart sounds: Normal heart sounds.   Pulmonary:      Effort: Pulmonary effort is normal.      Breath sounds: Normal breath sounds.   Abdominal:      General: Bowel sounds are normal. There is distension.      Palpations: Abdomen is soft.      Tenderness: There is abdominal tenderness in the suprapubic area. There is no guarding or rebound.   Musculoskeletal:         General: Normal range of motion.      Cervical back: Normal range of motion.        Legs:       Comments: Incision noted to left knee without induration, fluctuance, crepitus or drainage.    Skin:     General: Skin is warm and dry.   Neurological:      General: No focal deficit present.      Mental Status: He is alert.                   ED Course     Labs Reviewed   COMP METABOLIC PANEL (14) - Abnormal; Notable for the following components:       Result Value    Glucose 120 (*)     Sodium 132 (*)     Potassium 6.1 (*)     CO2 19.0 (*)     BUN 80 (*)     Creatinine 5.90 (*)     Calculated Osmolality 299 (*)     eGFR-Cr 9  (*)     AST 34 (*)     All other components within normal limits   CBC WITH DIFFERENTIAL WITH PLATELET - Abnormal; Notable for the following components:    HGB 12.1 (*)     HCT 37.1 (*)     Lymphocyte Absolute 0.78 (*)     Monocyte Absolute 1.41 (*)     All other components within normal limits   LIPASE - Abnormal; Notable for the following components:    Lipase 71 (*)     All other components within normal limits   URINALYSIS WITH CULTURE REFLEX - Abnormal; Notable for the following components:    Urine Color Colorless (*)     Blood Urine 2+ (*)     Leukocyte Esterase Urine 75 (*)     RBC Urine >10 (*)     Squamous Epi. Cells Few (*)     All other components within normal limits   EXPANDED BLOOD GAS, ARTERIAL - Abnormal; Notable for the following components:    ABG pH 7.34 (*)     ABG pCO2 32 (*)     ABG HCO3 19.0 (*)     Blood Gas Base Excess -7.4 (*)     Potassium Blood Gas 6.4 (*)     Sodium Blood Gas 130 (*)     Methemoglobin 1.7 (*)     All other components within normal limits   POCT GLUCOSE - Abnormal; Notable for the following components:    POC Glucose  107 (*)     All other components within normal limits   POCT GLUCOSE - Abnormal; Notable for the following components:    POC Glucose  133 (*)     All other components within normal limits   MAGNESIUM - Normal   PTT, ACTIVATED   URINE CULTURE, ROUTINE       ED Course as of 05/17/25 1115  ------------------------------------------------------------  Time: 05/17 0853  Value: XR CHEST AP PORTABLE  (CPT=71045)  Comment: Per my independent interpretation, patient's CXR demonstrates no PNA.       Results                                MDM          Admission disposition: 5/17/2025 10:31 AM           Medical Decision Making  Differential diagnosis includes but is not limited to UTI, urinary retention, renal injury, electrolyte disturbance, arrhythmia, DVT, etc.    On arrival patient was distended and had urinary retention.  Diaz catheter was placed and over 2 L of  urine drained.  Urinalysis reveals no evidence for any urinary tract infection.  Patient CBC was unremarkable.  His EKG showed no acute ischemic changes.  Chemistry resulted with critical hyperkalemia and acute renal failure likely secondary to urinary retention.  Patient was started on IV fluids and was given albuterol, insulin, D50, and Lokelma.  Ultrasound was positive for DVT.  He was also started on heparin drip.  VQ scan has been ordered.  Nephrology, urology, orthopedics are on consult.    Rhythm Strip: Rate 88 sinus rhythm as interpreted by myself. The cardiac monitor was ordered secondary to the patient's dyspnea.     Complicating factors: The patient  has a past medical history of Back problem, CAD (coronary artery disease), Cancer (HCC), Cataract, Diabetes (HCC), Diabetes type 2 (HCC), High blood pressure, High cholesterol, History of colon polyps, HTN (hypertension), Hyperlipidemia, Osteoarthritis, and Visual impairment. and  has a past surgical history that includes total knee replacement (Right, 2014); hip replacement surgery (Right, 2007); anesth,lower arm surgery (Right); skin surgery; cataract (Bilateral); colonoscopy; and spine surgery procedure unlisted. that contribute to the medical complexity of this ED evaluation.     Medical Record Review: I personally reviewed available prior medical records for any recent pertinent discharge summaries, testing, and procedures, and reviewed those reports.        Problems Addressed:  Acute deep vein thrombosis (DVT) of popliteal vein of right lower extremity (HCC): acute illness or injury with systemic symptoms  Acute renal insufficiency: acute illness or injury with systemic symptoms that poses a threat to life or bodily functions  Hyperkalemia: acute illness or injury with systemic symptoms that poses a threat to life or bodily functions  Urinary retention: acute illness or injury with systemic symptoms    Amount and/or Complexity of Data  Reviewed  Independent Historian:      Details: Family as per [facility]  External Data Reviewed: notes.     Details: Discharge summary from 5/10/25 reviewed, patient was admitted for TKA of the left knee and was discharged the next day.  Labs: ordered. Decision-making details documented in ED Course.  Radiology: ordered and independent interpretation performed. Decision-making details documented in ED Course.  ECG/medicine tests: ordered and independent interpretation performed. Decision-making details documented in ED Course.  Discussion of management or test interpretation with external provider(s): Case discussed with Dr. Acuña who would like in addition to nephrology consult and Ortho consult urology consult.  Dr. Cancino with nephrology is consulted and agrees with insulin, D50, albuterol, and Lokelma and does not recommend bicarb at this time. Dr. Zamora accepts urology consult.  Dr. Behery is informed of patient admission.     Risk  Drug therapy requiring intensive monitoring for toxicity.    Critical Care  Total time providing critical care: minutes (46 minutes including time spent examining and re-evaluating the patient, ordering and reviewing laboratory tests, documenting, reviewing previous records, obtaining information from the family, and speaking with consultants, admitting doctors, nurses and medics and excludes any time spent on procedures.  )        Disposition and Plan     Clinical Impression:  1. Acute renal insufficiency    2. Hyperkalemia    3. Urinary retention    4. Acute deep vein thrombosis (DVT) of popliteal vein of right lower extremity (HCC)         Disposition:  Admit  5/17/2025 10:31 am    Follow-up:  No follow-up provider specified.  We recommend that you schedule follow up care with a primary care provider within the next three months to obtain basic health screening including reassessment of your blood pressure.      Medications Prescribed:  Current Discharge Medication List                 Supplementary Documentation:         Hospital Problems       Present on Admission  Date Reviewed: 5/9/2025          ICD-10-CM Noted POA    * (Principal) Acute renal insufficiency N28.9 5/17/2025 Unknown

## 2025-05-17 NOTE — H&P
[facility]  History & Physical     Fransisco David Sr.  : 1945    Status: Inpatient  Day #: 0    Attending: Woo Acuña MD  PCP: Tressa Bowman MD     Date of Encounter:  2025  Date of Admission:  2025     Chief Complaint:  abdominal pain      History of Present Illness:     Fransisco David Sr. is a(n) 80 year old male with a h/o PAD, DM2, BPH, HTN, HL who just had left total knee arthroplasty on 25. He was on asa postop for DVT ppx. Postoperative course was uneventful and the patient was discharged on 5/10/25. His left knee function and pain have improved, but he presents to ED with difficulty urinating, abdominal pain, and heartburn with SOB. He was found to have cr 5.9, K 6.1 and upon catheterization 2L of urine came out. He's feeling much better now. He was found to have a LLE DVT as well and started on heparin IV.       Past Medical History[1]  Past Surgical History[2]    Family History[3]    Social History:  Short Social Hx on File[4]    Allergies: Allergies[5]       ROS/Exam       A comprehensive 12 point review of systems was negative. See History of Present Illness for other pertinent details.     Physical Exam:     Temp:  [97.7 °F (36.5 °C)-98 °F (36.7 °C)] 97.7 °F (36.5 °C)  Pulse:  [] 101  Resp:  [15-22] 20  BP: ()/(63-78) 119/63  SpO2:  [95 %-100 %] 96 %  General:  Alert, no distress  HEENT:  Normocephalic, atraumatic  Neck:  Supple, symmetrical  Cardiac:  Regular rate, regular rhythm  Pulmonary:  Clear to auscultation bilaterally, respirations unlabored  Gastrointestinal:  Soft, non-tender, normal bowel sounds  Musculoskeletal:  No joint swelling  Extremities:  No edema, no cyanosis, no clubbing  Neurologic:  nonfocal  Psychiatric:  Normal affect, calm and appropriate  Skin:  No rash, no lesion     Results:       Recent Labs   Lab 25  0824   WBC 9.0   HGB 12.1*   HCT 37.1*   .0   RBC 3.94   MCV 94.2   MCH 30.7   MCHC 32.6   RDW  13.1   NEPRELIM 6.56     Recent Labs   Lab 05/17/25  0915 05/17/25  1041   BUN 80*  --    CREATSERUM 5.90*  --    CA 8.8  --    ALB 4.2  --    *  --    K 6.1*  --      --    CO2 19.0*  --    *  --    MG 2.5  --    BILT 0.6  --    AST 34*  --    ALT 19  --    ALKPHO 74  --    TP 6.8  --    LIP 71*  --    PTT  --  31.0     NM LUNG VQ VENT / PERFUSION SCAN  (CPT=78582)  Result Date: 5/17/2025  CONCLUSION: Low probability for pulmonary embolus.  Per small to moderate-sized perfusion defects at the lateral aspect of the right upper/mid lung correspond to pleural plaques in this region.  There are no other perfusion defects clearly demonstrated.  There are no ventilatory defects.     Dictated by (CST): Tho Montoya MD on 5/17/2025 at 11:47 AM     Finalized by (CST): Tho Montoya MD on 5/17/2025 at 11:52 AM          US VENOUS DOPPLER LEG BILAT - DIAG IMG (CPT=93970)  Result Date: 5/17/2025  CONCLUSION:   1. Acute appearing expansile near occlusive DVT within the distal left popliteal vein and 1 of 2 left peroneal veins.  No other DVT is identified within the left lower extremity.  2. No sonographic evidence of right lower extremity DVT.    Dictated by (CST): Tho Montoya MD on 5/17/2025 at 9:58 AM     Finalized by (CST): Tho Montoya MD on 5/17/2025 at 10:00 AM          XR CHEST AP PORTABLE  (CPT=71045)  Result Date: 5/17/2025  CONCLUSION: No acute cardiopulmonary process clearly visualized.    Dictated by (CST): Tho Montoya MD on 5/17/2025 at 8:53 AM     Finalized by (CST): Tho Montoya MD on 5/17/2025 at 8:56 AM          EKG  Result Date: 5/17/2025  Normal sinus rhythm with sinus arrhythmia Normal ECG When compared with ECG of 29-APR-2025 10:07, No significant change was found       Assessment and Plan:     SAVANNAH 2/2 urinary retention  H/o BPH  Metabolic acidosis  -nephrology on consult  -urology on consult  -catheter placed  -monitor UOP  -avoid nephrotoxic meds  -cont  flomax    Hyperkalemia due to SAVANNAH  -treated in ED  -monitor  -recheck BMP later today    Hyponatremia  -monitor    LLE DVT related to recent surgery  -he was appropriately taking asa BID post-op  -cont heparin drip  -can hopefully switch to DOAC after kidney function improves  -VQ scan low probability for PE    Recent left TKA  -pain control  -PT/OT  -ortho on consult    DM2  -hold oral meds  -monitor BG, cover ISS    H/o PAD  HL  -cont statin    GERD  -PPI  -tums prn    DVT ppx:  on heparin drip           **Certification      PHYSICIAN Certification of Need for Inpatient Hospitalization - Initial Certification    Patient will require inpatient services that will reasonably be expected to span two midnight's based on the clinical documentation in H+P.   Based on patients current state of illness, I anticipate that, after discharge, patient will require TBD.    Woo Acuña MD         [1]   Past Medical History:   Back problem    CAD (coronary artery disease)    Cancer (HCC)    skin    Cataract    Diabetes (HCC)    Diabetes type 2 (HCC)    High blood pressure    High cholesterol    History of colon polyps    HTN (hypertension)    Hyperlipidemia    Osteoarthritis    Visual impairment    reading glasses   [2]   Past Surgical History:  Procedure Laterality Date    Anesth,lower arm surgery Right     wrist surgery    Cataract Bilateral     Colonoscopy      Hip replacement surgery Right     Skin surgery      skin cancer removed from face and chest    Spine surgery procedure unlisted      Total knee replacement Right    [3]   Family History  Problem Relation Age of Onset    Other (Other) Father         cirrohsis    Cancer Mother         liver    Other (Other) Brother         MI   [4]   Social History  Socioeconomic History    Marital status:    Tobacco Use    Smoking status: Former     Types: Cigarettes     Start date:      Quit date:      Years since quittin.4     Passive exposure: Past     Smokeless tobacco: Never   Vaping Use    Vaping status: Never Used   Substance and Sexual Activity    Alcohol use: Yes     Comment: socially    Drug use: Never   [5]   Allergies  Allergen Reactions    Neomycin-Bacitracin-Polymyxin RASH     With regular use

## 2025-05-17 NOTE — ED QUICK NOTES
Chief Complaint   Patient presents with    Abdomen/Flank Pain     Patient to ed via private vehicle with multiple complaints. Patient s/p left knee replacement, stated since then he has been having decreased appetite, acid reflux, and abdominal distension. Patient stated he has had frequent urination but feels like he still needs to urinate. Patient has had small bouts of diarrhea and stated he tried to push his urine out but cannot. Patient arrives to ed with distended abdomen, firm and found.     Patient changed in to gown on nibp cardiac and spo2 monitors. Side railsx2 call light within reach blanket provided.     Patient's bladder scanned to be >1000

## 2025-05-17 NOTE — ED QUICK NOTES
Orders for admission, patient is aware of plan and ready to go upstairs. Any questions, please call ED RN aaron  at extension [phone].   Chief Complaint   Patient presents with    Abdomen/Flank Pain       Patient AO x 3  Ambulation: with walker s/p left knee replacement  Belongings: accompanying patient  Medications: see mar   IV: 20g lac  Language: english  COVID-19 suspicion level/status: na  CIWA SCORE: na  NIH: na  Other pertinent information:  na

## 2025-05-18 ENCOUNTER — APPOINTMENT (OUTPATIENT)
Dept: ULTRASOUND IMAGING | Facility: HOSPITAL | Age: 80
End: 2025-05-18
Attending: INTERNAL MEDICINE
Payer: MEDICARE

## 2025-05-18 LAB
ALBUMIN SERPL-MCNC: 4.1 G/DL (ref 3.2–4.8)
ANION GAP SERPL CALC-SCNC: 12 MMOL/L (ref 0–18)
APTT PPP: 203.8 SECONDS (ref 23–36)
APTT PPP: 59.5 SECONDS (ref 23–36)
APTT PPP: 96 SECONDS (ref 23–36)
ATRIAL RATE: 98 BPM
BASOPHILS # BLD AUTO: 0.04 X10(3) UL (ref 0–0.2)
BASOPHILS NFR BLD AUTO: 0.5 %
BUN BLD-MCNC: 24 MG/DL (ref 9–23)
BUN/CREAT SERPL: 15.9 (ref 10–20)
CALCIUM BLD-MCNC: 9 MG/DL (ref 8.7–10.4)
CHLORIDE SERPL-SCNC: 108 MMOL/L (ref 98–112)
CO2 SERPL-SCNC: 17 MMOL/L (ref 21–32)
CREAT BLD-MCNC: 1.51 MG/DL (ref 0.7–1.3)
DEPRECATED RDW RBC AUTO: 46 FL (ref 35.1–46.3)
EGFRCR SERPLBLD CKD-EPI 2021: 46 ML/MIN/1.73M2 (ref 60–?)
EOSINOPHIL # BLD AUTO: 0.29 X10(3) UL (ref 0–0.7)
EOSINOPHIL NFR BLD AUTO: 3.3 %
ERYTHROCYTE [DISTWIDTH] IN BLOOD BY AUTOMATED COUNT: 13.2 % (ref 11–15)
GLUCOSE BLD-MCNC: 118 MG/DL (ref 70–99)
GLUCOSE BLDC GLUCOMTR-MCNC: 103 MG/DL (ref 70–99)
GLUCOSE BLDC GLUCOMTR-MCNC: 113 MG/DL (ref 70–99)
GLUCOSE BLDC GLUCOMTR-MCNC: 126 MG/DL (ref 70–99)
GLUCOSE BLDC GLUCOMTR-MCNC: 129 MG/DL (ref 70–99)
HCT VFR BLD AUTO: 38.7 % (ref 39–53)
HGB BLD-MCNC: 12.9 G/DL (ref 13–17.5)
IMM GRANULOCYTES # BLD AUTO: 0.05 X10(3) UL (ref 0–1)
IMM GRANULOCYTES NFR BLD: 0.6 %
LYMPHOCYTES # BLD AUTO: 1.11 X10(3) UL (ref 1–4)
LYMPHOCYTES NFR BLD AUTO: 12.6 %
MCH RBC QN AUTO: 31.6 PG (ref 26–34)
MCHC RBC AUTO-ENTMCNC: 33.3 G/DL (ref 31–37)
MCV RBC AUTO: 94.9 FL (ref 80–100)
MONOCYTES # BLD AUTO: 1.3 X10(3) UL (ref 0.1–1)
MONOCYTES NFR BLD AUTO: 14.8 %
NEUTROPHILS # BLD AUTO: 5.99 X10 (3) UL (ref 1.5–7.7)
NEUTROPHILS # BLD AUTO: 5.99 X10(3) UL (ref 1.5–7.7)
NEUTROPHILS NFR BLD AUTO: 68.2 %
OSMOLALITY SERPL CALC.SUM OF ELEC: 289 MOSM/KG (ref 275–295)
P AXIS: 61 DEGREES
P-R INTERVAL: 164 MS
PHOSPHATE SERPL-MCNC: 4 MG/DL (ref 2.4–5.1)
PLATELET # BLD AUTO: 297 10(3)UL (ref 150–450)
POTASSIUM SERPL-SCNC: 5.5 MMOL/L (ref 3.5–5.1)
Q-T INTERVAL: 334 MS
QRS DURATION: 80 MS
QTC CALCULATION (BEZET): 426 MS
R AXIS: 35 DEGREES
RBC # BLD AUTO: 4.08 X10(6)UL (ref 3.8–5.8)
SODIUM SERPL-SCNC: 137 MMOL/L (ref 136–145)
T AXIS: 61 DEGREES
VENTRICULAR RATE: 98 BPM
WBC # BLD AUTO: 8.8 X10(3) UL (ref 4–11)

## 2025-05-18 PROCEDURE — 76770 US EXAM ABDO BACK WALL COMP: CPT | Performed by: INTERNAL MEDICINE

## 2025-05-18 PROCEDURE — 99223 1ST HOSP IP/OBS HIGH 75: CPT | Performed by: INTERNAL MEDICINE

## 2025-05-18 PROCEDURE — 99233 SBSQ HOSP IP/OBS HIGH 50: CPT | Performed by: HOSPITALIST

## 2025-05-18 NOTE — PHYSICAL THERAPY NOTE
PHYSICAL THERAPY EVALUATION - INPATIENT     Room Number: 302/302-A  Evaluation Date: 5/18/2025  Type of Evaluation: Initial   Physician Order: PT Eval and Treat    Presenting Problem: actue renal insufficiency/urinary retention, acute DVT LLE on heparin  Co-Morbidities : R TKA 5/9/25, DM2, HTN  Reason for Therapy: Mobility Dysfunction and Discharge Planning    PHYSICAL THERAPY ASSESSMENT   Patient is a 80 year old male admitted 5/17/2025 for abdominal pain and pressure, found to be retaining urine/acute renal insufficiency as well as L calf DVT on heparin x 24 hrs. WBAT after recent TKA 5/9 and having HHPT, wife and family home and supportive.  Prior to admission, patient's baseline is having help from family after surgery, normally is completely independent and driving.  Patient is currently functioning near baseline with bed mobility, transfers, gait, stair negotiation, maintaining seated position, and standing prolonged periods.  Patient is requiring supervision and minimal assist as a result of the following impairments: decreased endurance/aerobic capacity, pain, decreased muscular endurance, and medical status.  Physical Therapy will continue to follow for duration of hospitalization.    Patient will benefit from continued skilled PT Services at discharge to promote prior level of function and safety with additional support and return home with home health PT.    PLAN DURING HOSPITALIZATION  Nursing Mobility Recommendation : 1 Assist  PT Device Recommendation: Rolling walker  PT Treatment Plan: Endurance, Patient education, Family education, Gait training, Neuromuscular re-educate, Range of motion, Strengthening, Stoop training, Transfer training  Rehab Potential : Good  Frequency (Obs): 3-5x/week     PHYSICAL THERAPY MEDICAL/SOCIAL HISTORY   History related to current admission: 80-year-old male status post left TKA on May 9, 8 days ago, presents for evaluation for generalized weakness, fatigue, abdominal  pain, increased urinary frequency, shortness of breath, acid reflux and right lower extremity swelling. Family states that they had forgotten to give him his Protonix for a few days but did start that on Wednesday, 3 days ago. For the past few days he has been having increased difficulty urinating with increased urinary frequency. He has been taking Tylenol PM at night to help him sleep. He has also been having epigastric burning and acid reflux symptoms. He denies any chest pain but does endorse some shortness of breath. He denies any fevers or chills. He has been having normal bowel movements      Problem List  Principal Problem:    Acute renal insufficiency  Active Problems:    Hyperkalemia    Urinary retention    Acute deep vein thrombosis (DVT) of popliteal vein of right lower extremity (HCC)      HOME SITUATION  Type of Home: House  Home Layout: Two level, Able to live on main level, Bed/bath upstairs  Stairs to Enter : 1   Railing: No    Stairs to Bedroom: 2 (1+1 stoop steps - has RW on each level)    Railing: No    Lives With: Spouse, Daughter (someone is always there)    Drives: Yes   Patient Regularly Uses: Rolling walker     Prior Level of [place]: patient living in a two story home with his wife, daughter also staying. He has been staying on the main level which is 2 steps up or down to kitchen and other living area. He is NOT going upstairs to his bedroom yet. He normally drives and is active without device prior to TKA 25 by Dr Behery    SUBJECTIVE  \"It feels good to walk but I could use a nap\"    PHYSICAL THERAPY EXAMINATION   OBJECTIVE  Precautions: Limb alert - left  Fall Risk: Standard fall risk    WEIGHT BEARING RESTRICTION  L Lower Extremity: Weight Bearing as Tolerated      PAIN ASSESSMENT  Ratin  Location: left knee after activity  Management Techniques: Activity promotion, Relaxation, Repositioning, Other (Comment) (medicated at end of session, ice applied)    COGNITION  Overall  Cognitive Status:  WFL - within functional limits    RANGE OF MOTION AND STRENGTH ASSESSMENT  Upper extremity ROM and strength are within functional limits   Lower extremity ROM is within functional limits except left knee 0-90  Lower extremity strength is within functional limits     BALANCE  Static Sitting: Normal  Dynamic Sitting: Good  Static Standing: Fair -  Dynamic Standing: Poor +    ACTIVITY TOLERANCE  Pulse: 108  Heart Rate Source: Monitor     BP: 118/78  BP Location: Right arm  BP Method: Automatic  Patient Position: Sitting    O2 WALK  Oxygen Therapy  SPO2% on Room Air at Rest: 96  SPO2% Ambulation on Room Air: 98    AM-PAC '6-Clicks' INPATIENT SHORT FORM - BASIC MOBILITY  How much difficulty does the patient currently have...  Patient Difficulty: Turning over in bed (including adjusting bedclothes, sheets and blankets)?: None   Patient Difficulty: Sitting down on and standing up from a chair with arms (e.g., wheelchair, bedside commode, etc.): A Little   Patient Difficulty: Moving from lying on back to sitting on the side of the bed?: None   How much help from another person does the patient currently need...   Help from Another: Moving to and from a bed to a chair (including a wheelchair)?: A Little   Help from Another: Need to walk in hospital room?: A Little   Help from Another: Climbing 3-5 steps with a railing?: A Little     AM-PAC Score:  Raw Score: 20   Approx Degree of Impairment: 35.83%   Standardized Score (AM-PAC Scale): 47.67   CMS Modifier (G-Code): CJ    FUNCTIONAL ABILITY STATUS  Functional Mobility/Gait Assessment  Gait Assistance: Supervision  Distance (ft): 250'x2  Assistive Device: Rolling walker  Pattern: L Steppage (early heel rise/stepping LLE)  Rolling: independent  Supine to Sit: modified independent  Sit to Supine: modified independent  Sit to Stand: supervision    Exercise/Education Provided:  Body mechanics  Energy conservation  Functional activity tolerated  Gait  training  Neuromuscular re-educate  ROM  Strengthening  Transfer training    Skilled Therapy Provided: RN Ed approves participation in therapy session. Patient presents in bed with multiple family members present, he is agreeable to therapy and family leaves during session. Patient has been on AC x 24 hrs and cleared for PT (has LLE DVT), also was retaining urine at admit and has a mello catheter. He reports feeling a bit tired but overall much better than at admit. He demonstrates good AROM of his left knee and peforms 10-15 reps of TKA HEP including AA SLR. He has been performing AP and QS hourly.  He is able to ambulate 250' at a time with RW with mild early heel rise on LLE. He is encouraged to walk with staff at least twice a day and perform HEP 1-2 times a day as he has been doing at home. All questions answered and needs in orly, RN in room.    The patient's Approx Degree of Impairment: 35.83% has been calculated based on documentation in the [facility] '6 clicks' Inpatient Basic Mobility Short Form.  Research supports that patients with this level of impairment may benefit from home with increased support and HHPT and this is the recommendation. He is current with HHPT after TKA. Final disposition will be made by interdisciplinary medical team.    Patient End of Session: In bed, With  staff, Needs met, Call light within orly, RN aware of session/findings, All patient questions and concerns addressed, Hospital anti-slip socks, SCDs in place, Ice applied, Alarm set, Other (Comment) (Knee switch locked in extension on bed)    CURRENT GOALS  Goals to be met by: 5/31/25  Patient Goal Patient's self-stated goal is: to go home   Goal #1 Patient is able to demonstrate supine - sit EOB @ level: independent     Goal #1   Current Status    Goal #2 Patient is able to demonstrate transfers Sit to/from Stand at assistance level: modified independent with walker - rolling     Goal #2  Current Status    Goal #3 Patient is  able to ambulate 450 feet with assist device: walker - rolling at assistance level: supervision   Goal #3   Current Status    Goal #4 Patient will negotiate 4 stairs/one curb w/ assistive device and supervision   Goal #4   Current Status    Goal #5 Patient to demonstrate independence with home activity/exercise instructions provided to patient in preparation for discharge.   Goal #5   Current Status    Goal #6    Goal #6  Current Status      Patient Evaluation Complexity Level:  History High - 3 or more personal factors and/or co-morbidities   Examination of body systems Moderate - addressing a total of 3 or more elements   Clinical Presentation  Moderate - Evolving   Clinical Decision Making  Moderate Complexity     Therapeutic Activity:  23 minutes

## 2025-05-18 NOTE — PLAN OF CARE
Pt alert and oriented x4. Denies chest pain/SOB. On RA. Vitals WNL. On hep gtt for DVT.   Problem: Patient Centered Care  Goal: Patient preferences are identified and integrated in the patient's plan of care  Description: Interventions:  - What would you like us to know as we care for you? From home with wife   Problem: CARDIOVASCULAR - ADULT  Goal: Maintains optimal cardiac output and hemodynamic stability  Description: INTERVENTIONS:  - Monitor vital signs, rhythm, and trends  - Monitor for bleeding, hypotension and signs of decreased cardiac output  - Evaluate effectiveness of vasoactive medications to optimize hemodynamic stability  - Monitor arterial and/or venous puncture sites for bleeding and/or hematoma  - Assess quality of pulses, skin color and temperature  - Assess for signs of decreased coronary artery perfusion - ex. Angina  - Evaluate fluid balance, assess for edema, trend weights  Outcome: Progressing  Goal: Absence of cardiac arrhythmias or at baseline  Description: INTERVENTIONS:  - Continuous cardiac monitoring, monitor vital signs, obtain 12 lead EKG if indicated  - Evaluate effectiveness of antiarrhythmic and heart rate control medications as ordered  - Initiate emergency measures for life threatening arrhythmias  - Monitor electrolytes and administer replacement therapy as ordered  Outcome: Progressing     Problem: RESPIRATORY - ADULT  Goal: Achieves optimal ventilation and oxygenation  Description: INTERVENTIONS:  - Assess for changes in respiratory status  - Assess for changes in mentation and behavior  - Position to facilitate oxygenation and minimize respiratory effort  - Oxygen supplementation based on oxygen saturation or ABGs  - Provide Smoking Cessation handout, if applicable  - Encourage broncho-pulmonary hygiene including cough, deep breathe, Incentive Spirometry  - Assess the need for suctioning and perform as needed  - Assess and instruct to report SOB or any respiratory  difficulty  - Respiratory Therapy support as indicated  - Manage/alleviate anxiety  - Monitor for signs/symptoms of CO2 retention  Outcome: Progressing     Problem: GASTROINTESTINAL - ADULT  Goal: Minimal or absence of nausea and vomiting  Description: INTERVENTIONS:  - Maintain adequate hydration with IV or PO as ordered and tolerated  - Nasogastric tube to low intermittent suction as ordered  - Evaluate effectiveness of ordered antiemetic medications  - Provide nonpharmacologic comfort measures as appropriate  - Advance diet as tolerated, if ordered  - Obtain nutritional consult as needed  - Evaluate fluid balance  Outcome: Progressing     Problem: GENITOURINARY - ADULT  Goal: Absence of urinary retention  Description: INTERVENTIONS:  - Assess patient’s ability to void and empty bladder  - Monitor intake/output and perform bladder scan as needed  - Follow urinary retention protocol/standard of care  - Consider collaborating with pharmacy to review patient's medication profile  - Implement strategies to promote bladder emptying  Outcome: Progressing     Problem: METABOLIC/FLUID AND ELECTROLYTES - ADULT  Goal: Glucose maintained within prescribed range  Description: INTERVENTIONS:  - Monitor Blood Glucose as ordered  - Assess for signs and symptoms of hyperglycemia and hypoglycemia  - Administer ordered medications to maintain glucose within target range  - Assess barriers to adequate nutritional intake and initiate nutrition consult as needed  - Instruct patient on self management of diabetes  Outcome: Progressing  Goal: Electrolytes maintained within normal limits  Description: INTERVENTIONS:  - Monitor labs and rhythm and assess patient for signs and symptoms of electrolyte imbalances  - Administer electrolyte replacement as ordered  - Monitor response to electrolyte replacements, including rhythm and repeat lab results as appropriate  - Fluid restriction as ordered  - Instruct patient on fluid and nutrition  restrictions as appropriate  Outcome: Progressing  Goal: Hemodynamic stability and optimal renal function maintained  Description: INTERVENTIONS:  - Monitor labs and assess for signs and symptoms of volume excess or deficit  - Monitor intake, output and patient weight  - Monitor urine specific gravity, serum osmolarity and serum sodium as indicated or ordered  - Monitor response to interventions for patient's volume status, including labs, urine output, blood pressure (other measures as available)  - Encourage oral intake as appropriate  - Instruct patient on fluid and nutrition restrictions as appropriate  Outcome: Progressing     Problem: MUSCULOSKELETAL - ADULT  Goal: Return mobility to safest level of function  Description: INTERVENTIONS:  - Assess patient stability and activity tolerance for standing, transferring and ambulating w/ or w/o assistive devices  - Assist with transfers and ambulation using safe patient handling equipment as needed  - Ensure adequate protection for wounds/incisions during mobilization  - Obtain PT/OT consults as needed  - Advance activity as appropriate  - Communicate ordered activity level and limitations with patient/family  Outcome: Progressing  Goal: Maintain proper alignment of affected body part  Description: INTERVENTIONS:  - Support and protect limb and body alignment per provider's orders  - Instruct and reinforce with patient and family use of appropriate assistive device and precautions (e.g. spinal or hip dislocation precautions)  Outcome: Progressing     - Provide timely, complete, and accurate information to patient/family  - Incorporate patient and family knowledge, values, beliefs, and cultural backgrounds into the planning and delivery of care  - Encourage patient/family to participate in care and decision-making at the level they choose  - Honor patient and family perspectives and choices  Outcome: Progressing

## 2025-05-18 NOTE — RESPIRATORY THERAPY NOTE
CPAP EVALUATION :     Pt stated he does not have a previous diagnosis of LINDY. Pt does not routinely use a CPAP device at home.      CPAP INITIATION:     Pt to be placed on CPAP: No, refused

## 2025-05-18 NOTE — PROGRESS NOTES
[facility]  [facility] Progress Note    Fransisco David Sr. Patient Status:  Inpatient    1945 MRN [id number]   Location [facility] 3W/SW Attending Woo Acuña MD   Hosp Day # 1 PCP Tressa Bowman MD     Fransisco David Sr. is a(n) 80 year old male admitted with:  Chief Complaint   Patient presents with    Abdomen/Flank Pain       SUBJECTIVE:   Reason for Urology Consult: Urinary Retention, Acute Renal Failure, Renal Cyst    NAEO.  Patient with >6 liters of urine output following catheter placement.  Feels well this morning.  No issues with catheter.    Past Medical History[1]  Past Surgical History[2]   Family History[3]  Allergies[4]   Social History:  Short Social Hx on File[5]     MEDICATIONS:   Current Medications[6]    I/Os:   I/O last 3 completed shifts:  In: 787.2 [P.O.:480; I.V.:307.2]  Out: 8950 [Urine:8950]     EXAM:     Vital 24 Hour Range   Temperature Temp  Min: 97.5 °F (36.4 °C)  Max: 98 °F (36.7 °C)   Pulse Pulse  Min: 81  Max: 114   Respiratory Resp  Min: 18  Max: 20   Non-Invasive  Blood Pressure BP  Min: 116/71  Max: 146/72   Pulse Oximetry SpO2  Min: 94 %  Max: 97 %     /77 (BP Location: Right arm)   Pulse 114   Temp 98 °F (36.7 °C) (Oral)   Resp 18   Wt 198 lb 8 oz (90 kg)   SpO2 97%   BMI 31.09 kg/m²   Body mass index is 31.09 kg/m².  Physical Exam  Constitutional:       Appearance: Normal appearance.   HENT:      Head: Normocephalic and atraumatic.   Cardiovascular:      Rate and Rhythm: Normal rate.      Pulses: Normal pulses.   Pulmonary:      Effort: Pulmonary effort is normal. No respiratory distress.   Abdominal:      General: Abdomen is flat.      Palpations: Abdomen is soft.   Skin:     General: Skin is warm and dry.   Neurological:      Mental Status: He is alert and oriented to person, place, and time. Mental status is at baseline.   Psychiatric:         Mood and Affect: Mood normal.         Behavior: Behavior normal.          IMAGING:     NM LUNG VQ VENT / PERFUSION SCAN  (CPT=78582)   Final Result   PROCEDURE: NM LUNG VQ/PERFUSION SCAN (CPT=78582)       COMPARISON: None.       INDICATIONS: eval for PE       TECHNIQUE: Ventilation/perfusion lung study done with 7 millicuries    Xenon-133 (inhaled), followed by 8.4 millicuries of Technetium-99m MAA    injected into the left antecubital vein.         FINDINGS:    PERFUSION: There are small to moderate-sized perfusion defects at the    lateral aspect of the right upper lung.  There are no other perfusion    defects.       VENTILATION - SINGLE BREATH: Normal homogeneous isotope distribution.     VENTILATION EQUILIBRIUM AND WASHOUT IMAGES: Normal.         V/Q MISMATCH: There is normal ventilation in the region of the perfusion    defects.       Comparison is made with a chest X-ray the perfusion defects correspond to    the areas of pleural plaque seen on radiograph of same date.                   =====   CONCLUSION: Low probability for pulmonary embolus.  Per small to    moderate-sized perfusion defects at the lateral aspect of the right    upper/mid lung correspond to pleural plaques in this region.  There are no    other perfusion defects clearly demonstrated.     There are no ventilatory defects.                 Dictated by (CST): Tho Montoya MD on 5/17/2025 at 11:47 AM        Finalized by (CST): Tho Montoya MD on 5/17/2025 at 11:52 AM               US VENOUS DOPPLER LEG BILAT - DIAG IMG (CPT=93970)   Final Result   PROCEDURE: US VENOUS DOPPLER LEG BILAT-DIAG IMG (CPT=93970)       COMPARISON: None.       INDICATIONS: eval for DVT       TECHNIQUE: Color duplex Doppler venous ultrasound of both lower    extremities was performed in the    usual manner.       FINDINGS:   There is acute appearing expansile occlusive or near occlusive    DVT within the distal left popliteal vein and 1 of 2 left peroneal veins.     There is no extension into the mid/proximal popliteal vein.   The remaining    deep veins of the left lower    extremity demonstrate normal blood flow, compressibility, and    augmentation.       There is normal blood flow, compressibility, and augmentation of the deep    veins of the right lower extremity.                   =====   CONCLUSION:        1. Acute appearing expansile near occlusive DVT within the distal left    popliteal vein and 1 of 2 left peroneal veins.  No other DVT is identified    within the left lower extremity.       2. No sonographic evidence of right lower extremity DVT.               Dictated by (CST): Tho Montoya MD on 5/17/2025 at 9:58 AM        Finalized by (CST): Tho Montoya MD on 5/17/2025 at 10:00 AM               XR CHEST AP PORTABLE  (CPT=71045)   Final Result   PROCEDURE: XR CHEST AP PORTABLE  (CPT=71045)   TIME: 841.         COMPARISON: [facility], XR CHEST PA + LAT CHEST    (CPT=71046), 4/29/2025, 10:14 AM.       INDICATIONS: Shortness of breath and abdominal pain.       TECHNIQUE:   Single view.         FINDINGS:    CARDIAC/VASC: Normal.  No cardiac silhouette abnormality or cardiomegaly.     Unremarkable pulmonary vasculature.    MEDIAST/LATASHA:   No visible mass or adenopathy.   LUNGS/PLEURA: There is redemonstration of calcified pleural plaques in the    lateral and superior aspect of the right chest wall.  There is minimal    atelectasis at the left lung base.  The lungs are otherwise clear.   BONES: Moderate degenerative spurring throughout the thoracic spine.   OTHER: Negative.                     =====   CONCLUSION: No acute cardiopulmonary process clearly visualized.               Dictated by (CST): Tho Montoya MD on 5/17/2025 at 8:53 AM        Finalized by (CST): Tho Montoya MD on 5/17/2025 at 8:56 AM               US KIDNEY/BLADDER (CPT=76770)    (Results Pending)         ISeymour DO, have interpreted the patient's imaging independently which is significant for no acute urologic  problems.  LABS:   CBC  Recent Labs   Lab 05/17/25  0824   RBC 3.94   HGB 12.1*   HCT 37.1*   MCV 94.2   MCH 30.7   MCHC 32.6   RDW 13.1   NEPRELIM 6.56   WBC 9.0   .0        BMP  Recent Labs   Lab 05/17/25  0915 05/17/25  1842 05/18/25  0904   * 106* 118*   BUN 80* 54* 24*   CREATSERUM 5.90* 3.04* 1.51*   EGFRCR 9* 20* 46*   CA 8.8 8.8 9.0   * 138 137   K 6.1* 5.8* 5.5*    107 108   CO2 19.0* 22.0 17.0*        Urinalysis/Cultures  Recent Labs   Lab 05/17/25  0850   COLORUR Colorless*   CLARITY Clear   SPECGRAVITY 1.008   GLUUR Normal   BILUR Negative   KETUR Negative   BLOODURINE 2+*   PHURINE 5.5   PROUR Negative   UROBILINOGEN Normal   NITRITE Negative   LEUUR 75*   WBCUR 1-5   RBCUR >10*   BACUR None Seen   EPIUR Few*        No results found for the last 90 days.      I, Seymour Ennis DO, have independently reviewed the patient's labs.  ASSESSMENT AND PLAN:   Impression:  Problem List[7]    Fransisco Nielsenjani Stroud. is a(n) 80 year old male with:    Urinary Retention  Acute Renal Failure  BPH  Renal Cyst     - Renal function improving with catheter drainage, would keep until creatinine nadirs  - Nephrology following, FERNANDEZ ordered, will follow to evaluate for hydronephrosis which would be expected with the degree of retention, will also evaluate for known renal cyst  - On Flomax for previous history of BPH, started on Finasteride now given his history of urinary retention  - Ambulate as able  - Avoid narcotics and anticholinergics  - Treat constipation    Seymour Ennis DO  [facility]  O:  [phone]  5/18/2025  10:58 AM         [1]   Past Medical History:   Back problem    CAD (coronary artery disease)    Cancer (HCC)    skin    Cataract    Diabetes (HCC)    Diabetes type 2 (HCC)    High blood pressure    High cholesterol    History of colon polyps    HTN (hypertension)    Hyperlipidemia    Osteoarthritis    Visual impairment    reading glasses   [2]   Past Surgical  History:  Procedure Laterality Date    Anesth,lower arm surgery Right     wrist surgery    Cataract Bilateral     Colonoscopy      Hip replacement surgery Right 2007    Skin surgery      skin cancer removed from face and chest    Spine surgery procedure unlisted      Total knee replacement Right 2014   [3]   Family History  Problem Relation Age of Onset    Other (Other) Father         cirrohsis    Cancer Mother         liver    Other (Other) Brother         MI   [4]   Allergies  Allergen Reactions    Neomycin-Bacitracin-Polymyxin RASH     With regular use   [5]   Social History  Socioeconomic History    Marital status:    Tobacco Use    Smoking status: Former     Types: Cigarettes     Start date:      Quit date:      Years since quittin.4     Passive exposure: Past    Smokeless tobacco: Never   Vaping Use    Vaping status: Never Used   Substance and Sexual Activity    Alcohol use: Yes     Comment: socially    Drug use: Never     Social Drivers of Health     Food Insecurity: No Food Insecurity (2025)    NCSS - Food Insecurity     Worried About Running Out of Food in the Last Year: No     Ran Out of Food in the Last Year: No   Transportation Needs: No Transportation Needs (2025)    NCSS - Transportation     Lack of Transportation: No   Housing Stability: Not At Risk (2025)    NCSS - Housing/Utilities     Has Housing: Yes     Worried About Losing Housing: No     Unable to Get Utilities: No   [6]   No current outpatient medications on file.   [7]   Patient Active Problem List  Diagnosis    Primary osteoarthritis of left knee    Essential hypertension    Hyperlipidemia    Diabetes type 2 (HCC)    BPH (benign prostatic hyperplasia)    Acute renal insufficiency    Hyperkalemia    Urinary retention    Acute deep vein thrombosis (DVT) of popliteal vein of right lower extremity (HCC)

## 2025-05-18 NOTE — CONSULTS
[facility]  part of [facility]    Report of Consultation    Fransisco Johan Joann Stroud. Patient Status:  Inpatient    1945 MRN [id number]   Location [facility] 3W/SW Attending Woo Acuña MD   Hosp Day # 1 PCP Tressa Bowman MD     Date of Admission:  2025  Date of Consult:  2025   Reason for Consultation:   SAVANNAH    History of Present Illness:   Patient is a 80 year old male who was admitted to the hospital for Acute renal insufficiency:  The patient just had a left knee arthroplasty on May 9.  Postoperatively he was attempting to do rehab and felt terribly.  He was unable to urinate and came to the emergency room.  He was found to be hyperkalemic with a creatinine of 5.9.    Upon catheterization, he had 2 L of urine output.    In addition he is found to have a left lower extremity DVT and was started on heparin    Past Medical History  Past Medical History:   Back problem    CAD (coronary artery disease)    Cancer (HCC)    skin    Cataract    Diabetes (HCC)    Diabetes type 2 (HCC)    High blood pressure    High cholesterol    History of colon polyps    HTN (hypertension)    Hyperlipidemia    Osteoarthritis    Visual impairment    reading glasses       Past Surgical History  Past Surgical History:  Procedure Laterality Date    Anesth,lower arm surgery Right     wrist surgery    Cataract Bilateral     Colonoscopy      Hip replacement surgery Right     Skin surgery      skin cancer removed from face and chest    Spine surgery procedure unlisted      Total knee replacement Right        Family History  Family History  Problem Relation Age of Onset    Other (Other) Father         cirrohsis    Cancer Mother         liver    Other (Other) Brother         MI       Social History  Social History  Socioeconomic History    Marital status:    Tobacco Use    Smoking status: Former     Types: Cigarettes     Start date:      Quit date:      Years since quitting:  67.4     Passive exposure: Past    Smokeless tobacco: Never   Vaping Use    Vaping status: Never Used   Substance and Sexual Activity    Alcohol use: Yes     Comment: socially    Drug use: Never     Social Drivers of Health     Food Insecurity: No Food Insecurity (5/17/2025)    NCSS - Food Insecurity     Worried About Running Out of Food in the Last Year: No     Ran Out of Food in the Last Year: No   Transportation Needs: No Transportation Needs (5/17/2025)    NCSS - Transportation     Lack of Transportation: No   Housing Stability: Not At Risk (5/17/2025)    NCSS - Housing/Utilities     Has Housing: Yes     Worried About Losing Housing: No     Unable to Get Utilities: No       Current Medications:  Current Facility-Administered Medications   Medication Dose Route Frequency    heparin (Porcine) 23392 units/250mL infusion PE/DVT/THROMBUS CONTINUOUS  200-3,000 Units/hr Intravenous Continuous    atorvastatin (Lipitor) tab 20 mg  20 mg Oral Daily    cholecalciferol (Vitamin D3) tab 1,000 Units  1,000 Units Oral Daily    polyethylene glycol (PEG 3350) (Miralax) 17 g oral packet 17 g  17 g Oral Daily    tamsulosin (Flomax) cap 0.4 mg  0.4 mg Oral Nightly    glucose (Dex4) 15 GM/59ML oral liquid 15 g  15 g Oral Q15 Min PRN    Or    glucose (Glutose) 40% oral gel 15 g  15 g Oral Q15 Min PRN    Or    glucose-vitamin C (Dex-4) chewable tab 4 tablet  4 tablet Oral Q15 Min PRN    Or    dextrose 50% injection 50 mL  50 mL Intravenous Q15 Min PRN    Or    glucose (Dex4) 15 GM/59ML oral liquid 30 g  30 g Oral Q15 Min PRN    Or    glucose (Glutose) 40% oral gel 30 g  30 g Oral Q15 Min PRN    Or    glucose-vitamin C (Dex-4) chewable tab 8 tablet  8 tablet Oral Q15 Min PRN    acetaminophen (Tylenol Extra Strength) tab 500 mg  500 mg Oral Q4H PRN    sennosides (Senokot) tab 17.2 mg  17.2 mg Oral Nightly PRN    ondansetron (Zofran) 4 MG/2ML injection 4 mg  4 mg Intravenous Q6H PRN    insulin aspart (NovoLOG) 100 Units/mL FlexPen 1-7  Units  1-7 Units Subcutaneous TID CC and HS    finasteride (Proscar) tab 5 mg  5 mg Oral Daily    traMADol (Ultram) tab 50 mg  50 mg Oral Q12H PRN    pantoprazole (Protonix) DR tab 40 mg  40 mg Oral BID AC    calcium carbonate (Tums) chewable tab 1,000 mg  1,000 mg Oral TID PRN     Medications Prior to Admission   Medication Sig    aspirin 81 MG Oral Tab EC Take 1 tablet (81 mg total) by mouth daily.    atorvastatin 20 MG Oral Tab Take 1 tablet (20 mg total) by mouth in the morning.    ramipril 10 MG Oral Cap Take 1 capsule (10 mg total) by mouth in the evening.    metFORMIN 500 MG Oral Tab Take 1 tablet (500 mg total) by mouth 2 (two) times daily with meals.    tamsulosin 0.4 MG Oral Cap Take 1 capsule (0.4 mg total) by mouth at bedtime.    Multiple Vitamin (ONE-DAILY MULTI VITAMINS) Oral Tab Take 1 tablet by mouth daily.    polyethylene glycol, PEG 3350, 17 g Oral Powd Pack Take 17 g by mouth in the morning.    Cholecalciferol (VITAMIN D3 OR) Take 1,000 Units by mouth daily.         Review of Systems:     General: weak       A comprehensive 12 point review of systems was completed.  Pertinent positives as above and all the rest were negative.     Physical Exam:   /71 (BP Location: Right arm)   Pulse 91   Temp 97.6 °F (36.4 °C) (Oral)   Resp 18   Wt 198 lb 8 oz (90 kg)   SpO2 94%   BMI 31.09 kg/m²      Intake/Output Summary (Last 24 hours) at 5/18/2025 0755  Last data filed at 5/18/2025 0600  Gross per 24 hour   Intake 787.2 ml   Output 8950 ml   Net -8162.8 ml     Wt Readings from Last 1 Encounters:   05/18/25 198 lb 8 oz (90 kg)       Exam  Gen: No acute distress  Heent: NC AT, mucous memb clear, neck supple  Pulm: Lungs clear, normal respiratory effort  CV: Heart with regular rate and rhythm, no edema  Abd: Abdomen soft, nontender, nondistended, no organomegaly, bowel sounds present  Skin: no symptoms reported  Psych: alert and oriented        Results:     Laboratory Data:  Recent Labs   Lab  05/17/25  0824   RBC 3.94   HGB 12.1*   HCT 37.1*   MCV 94.2   MCH 30.7   MCHC 32.6   RDW 13.1   NEPRELIM 6.56   WBC 9.0   .0         Recent Labs   Lab 05/17/25  0915 05/17/25  1842   * 106*   BUN 80* 54*   CREATSERUM 5.90* 3.04*   CA 8.8 8.8   * 138   K 6.1* 5.8*    107   CO2 19.0* 22.0        Imaging:  NM LUNG VQ VENT / PERFUSION SCAN  (CPT=78582)  Result Date: 5/17/2025  CONCLUSION: Low probability for pulmonary embolus.  Per small to moderate-sized perfusion defects at the lateral aspect of the right upper/mid lung correspond to pleural plaques in this region.  There are no other perfusion defects clearly demonstrated.  There are no ventilatory defects.     Dictated by (CST): Tho Montoya MD on 5/17/2025 at 11:47 AM     Finalized by (CST): Tho Montoya MD on 5/17/2025 at 11:52 AM          US VENOUS DOPPLER LEG BILAT - DIAG IMG (CPT=93970)  Result Date: 5/17/2025  CONCLUSION:   1. Acute appearing expansile near occlusive DVT within the distal left popliteal vein and 1 of 2 left peroneal veins.  No other DVT is identified within the left lower extremity.  2. No sonographic evidence of right lower extremity DVT.    Dictated by (CST): Tho Montoya MD on 5/17/2025 at 9:58 AM     Finalized by (CST): Tho Montoya MD on 5/17/2025 at 10:00 AM          XR CHEST AP PORTABLE  (CPT=71045)  Result Date: 5/17/2025  CONCLUSION: No acute cardiopulmonary process clearly visualized.    Dictated by (CST): Tho Montoya MD on 5/17/2025 at 8:53 AM     Finalized by (CST): Tho Montoya MD on 5/17/2025 at 8:56 AM                    Impression/Receommendations:     1 - SAVANNAH/hyperkalemia/urinary retension  Due to obstructive uropathy.  In addition the patient had ramipril and Celebrex noted on his med list which likely contributed as well.    Maintain Diaz catheter.  Urology is following him.  Will check a renal ultrasound today    Recheck labs to evaluate potassium    2 - LLE DVT  On  heparin drip, okay to use a DOAC once renal function improves    3 -recent left TKA  Supportive care    4 - Dm 2  Accu-Cheks    5 - HTN   Ramipril on hold  Thank you for allowing me to participate in the care of your patient.    TAMY CASTILLO MD  5/18/2025

## 2025-05-18 NOTE — PROGRESS NOTES
[facility]  Progress Note     Fransisco David Sr.  : 1945    Status: Inpatient  Day #: 1    Attending: Woo Acuña MD  PCP: Tressa Bowman MD     Assessment and Plan:    SAVANNAH 2/2 urinary retention  H/o BPH  Metabolic acidosis  -nephrology on consult  -urology on consult  -catheter placed  -monitor UOP - large  -avoid nephrotoxic meds  -cont flomax. Finasteride started.  -today's labs pending     Hyperkalemia due to SAVANNAH  -treated  -labs pending     Hyponatremia  -monitor     LLE DVT related to recent surgery  -he was appropriately taking asa BID post-op  -cont heparin drip  -can hopefully switch to DOAC after kidney function improves  -VQ scan low probability for PE     Recent left TKA  -pain control  -PT/OT  -ortho on consult     DM2  -hold oral meds  -monitor BG, cover ISS     H/o PAD  HL  -cont statin     GERD  -PPI  -tums prn        DVT Mechanical Prophylaxis:   SCDs, Early ambuation  DVT Pharmacologic Prophylaxis   Medication    heparin (Porcine) 19719 units/250mL infusion PE/DVT/THROMBUS CONTINUOUS         DVT Pharmacologic prophylaxis: Aspirin 81 mg     Subjective:      Initial Chief Complaint:  abdominal pain    No abd pain. No CP, no SOB.     10 point ROS completed and was negative, except for pertinent positive and negatives stated in subjective.      Objective:      Temp:  [97.5 °F (36.4 °C)-98 °F (36.7 °C)] 98 °F (36.7 °C)  Pulse:  [] 114  Resp:  [17-22] 18  BP: ()/(62-78) 122/77  SpO2:  [94 %-97 %] 97 %  General:  Alert, no distress  HEENT:  Normocephalic, atraumatic  Cardiac:  Regular rate, regular rhythm  Pulmonary:  Clear to auscultation bilaterally, respirations unlabored  Gastrointestinal:  Soft, non-tender, normal bowel sounds  Musculoskeletal:  No joint swelling  Extremities:  No edema, no cyanosis, no clubbing  Neurologic:  nonfocal  Psychiatric:  Normal affect, calm and appropriate    Intake/Output Summary (Last 24 hours) at 2025 1025  Last data filed  at 5/18/2025 0839  Gross per 24 hour   Intake 787.2 ml   Output 7650 ml   Net -6862.8 ml         Recent Labs   Lab 05/17/25  0824   WBC 9.0   HGB 12.1*   HCT 37.1*   .0   RBC 3.94   MCV 94.2   MCH 30.7   MCHC 32.6   RDW 13.1   NEPRELIM 6.56     Recent Labs   Lab 05/17/25  0915 05/17/25  1041 05/17/25  1713 05/17/25  1842 05/18/25  0139   BUN 80*  --   --  54*  --    CREATSERUM 5.90*  --   --  3.04*  --    CA 8.8  --   --  8.8  --    ALB 4.2  --   --   --   --    *  --   --  138  --    K 6.1*  --   --  5.8*  --      --   --  107  --    CO2 19.0*  --   --  22.0  --    *  --   --  106*  --    MG 2.5  --   --   --   --    BILT 0.6  --   --   --   --    AST 34*  --   --   --   --    ALT 19  --   --   --   --    ALKPHO 74  --   --   --   --    TP 6.8  --   --   --   --    LIP 71*  --   --   --   --    PTT  --  31.0 60.8*  --  203.8*       NM LUNG VQ VENT / PERFUSION SCAN  (CPT=78582)  Result Date: 5/17/2025  CONCLUSION: Low probability for pulmonary embolus.  Per small to moderate-sized perfusion defects at the lateral aspect of the right upper/mid lung correspond to pleural plaques in this region.  There are no other perfusion defects clearly demonstrated.  There are no ventilatory defects.     Dictated by (CST): Tho Montoya MD on 5/17/2025 at 11:47 AM     Finalized by (CST): Tho Montoya MD on 5/17/2025 at 11:52 AM          US VENOUS DOPPLER LEG BILAT - DIAG IMG (CPT=93970)  Result Date: 5/17/2025  CONCLUSION:   1. Acute appearing expansile near occlusive DVT within the distal left popliteal vein and 1 of 2 left peroneal veins.  No other DVT is identified within the left lower extremity.  2. No sonographic evidence of right lower extremity DVT.    Dictated by (CST): Tho Montoya MD on 5/17/2025 at 9:58 AM     Finalized by (CST): Tho Montoya MD on 5/17/2025 at 10:00 AM          XR CHEST AP PORTABLE  (CPT=71045)  Result Date: 5/17/2025  CONCLUSION: No acute cardiopulmonary  process clearly visualized.    Dictated by (CST): Tho Montoya MD on 5/17/2025 at 8:53 AM     Finalized by (CST): Tho Montoya MD on 5/17/2025 at 8:56 AM          EKG  Result Date: 5/18/2025  Normal sinus rhythm with sinus arrhythmia Normal ECG When compared with ECG of 29-APR-2025 10:07, No significant change was found    Medications:  Scheduled Medications[1]   PRN Meds: PRN Medications[2]    Supplementary Documentation:   DVT Mechanical Prophylaxis:   SCDs, Early ambuation  DVT Pharmacologic Prophylaxis   Medication    heparin (Porcine) 47323 units/250mL infusion PE/DVT/THROMBUS CONTINUOUS         DVT Pharmacologic prophylaxis: Aspirin 81 mg      Code Status: Not on file  Diaz: Diaz catheter in place  Diaz Duration (in days): 2  Central line:    RA: 5/21/2025                    [facility]. Time spent on chart/note review, review of labs/imaging, discussion with patient, physical exam, discussion with staff, consultants, coordinating care, writing progress note, discussion of plan of care.      Woo Acuña MD         [1]    atorvastatin  20 mg Oral Daily    cholecalciferol  1,000 Units Oral Daily    polyethylene glycol (PEG 3350)  17 g Oral Daily    tamsulosin  0.4 mg Oral Nightly    insulin aspart  1-7 Units Subcutaneous TID CC and HS    finasteride  5 mg Oral Daily    pantoprazole  40 mg Oral BID AC   [2]   glucose **OR** glucose **OR** glucose-vitamin C **OR** dextrose **OR** glucose **OR** glucose **OR** glucose-vitamin C    acetaminophen    sennosides    ondansetron    traMADol    calcium carbonate

## 2025-05-18 NOTE — OCCUPATIONAL THERAPY NOTE
OCCUPATIONAL THERAPY EVALUATION - INPATIENT     Room Number: 302/302-A  Evaluation Date: 5/18/2025  Type of Evaluation: Initial  Presenting Problem: SAVANNAH 2/2 urinary retention  H/o BPH  Metabolic acidosis, LLE DVT (recent L TKA May 9)    Physician Order: IP Consult to Occupational Therapy  Reason for Therapy: ADL/IADL Dysfunction and Discharge Planning    OCCUPATIONAL THERAPY ASSESSMENT   Patient is a 80 year old male admitted 5/17/2025.  Prior to admission, patient's baseline is mod I.  Patient is currently functioning near baseline with ADLs/mobility.  Patient is requiring supervision, stand-by assist, and contact guard assist as a result of the following impairments: decreased functional reach, impaired standing balance, decreased muscular endurance, and limited LLE ROM. Occupational Therapy will continue to follow for duration of hospitalization.    Patient will benefit from continued skilled OT Services for duration of hospitalization, however, given the patient is functioning near baseline level do not anticipate skilled therapy needs at discharge .    PLAN DURING HOSPITALIZATION  OT Device Recommendations:  (owns AE needed)  OT Treatment Plan: Balance activities, ADL training, Functional transfer training, Patient/Family education, Patient/Family training, Equipment eval/education, Compensatory technique education     OCCUPATIONAL THERAPY MEDICAL/SOCIAL HISTORY   Problem List  Principal Problem:    Acute renal insufficiency  Active Problems:    Hyperkalemia    Urinary retention    Acute deep vein thrombosis (DVT) of popliteal vein of right lower extremity (HCC)    HOME SITUATION  Type of Home: House  Home Layout: Two level; Able to live on main level  Lives With: Spouse  Toilet and Equipment: Comfort height toilet; Grab bar  Shower/Tub and Equipment: Walk-in shower; Shower chair  Drives: Yes (prior to recent knee surgery)  Patient Regularly Uses: Rolling walker      Prior Level of [place]: mod I DINA Guerrero  mobility; wife and dtr assist as needed with recent L TKA    SUBJECTIVE  \"I have a tall boy at home\"- referring to toilet    OCCUPATIONAL THERAPY EXAMINATION      OBJECTIVE  Fall Risk: Standard fall risk      PAIN ASSESSMENT  Ratin         COGNITION  Overall Cognitive Status:  WFL - within functional limits    VISION  Current Vision: no visual deficits      Communication: WFL    Behavioral/Emotional/Social: WFL    RANGE OF MOTION   Upper extremity ROM is within functional limits     STRENGTH ASSESSMENT  Upper extremity strength is within functional limits     COORDINATION  Gross Motor: WFL   Fine Motor: WFL     ACTIVITIES OF DAILY LIVING ASSESSMENT  AM-PAC ‘6-Clicks’ Inpatient Daily Activity Short Form  How much help from another person does the patient currently need…  -   Putting on and taking off regular lower body clothing?: A Little  -   Bathing (including washing, rinsing, drying)?: A Little  -   Toileting, which includes using toilet, bedpan or urinal? : None  -   Putting on and taking off regular upper body clothing?: None  -   Taking care of personal grooming such as brushing teeth?: None  -   Eating meals?: None    AM-PAC Score:  Score: 22  Approx Degree of Impairment: 25.8%  Standardized Score (AM-PAC Scale): 47.1  CMS Modifier (G-Code): CJ    FUNCTIONAL TRANSFER ASSESSMENT  Sit to Stand: Edge of Bed  Edge of Bed: Stand-by Assist  Toilet Transfer: Contact Guard Assist (grab bar)  Functional mobility activity with SPV and RW    BED MOBILITY  Supine to Sit : Stand-by Assist  Sit to Supine (OT): Contact Guard Assist    BALANCE ASSESSMENT  Static Sitting: Modified Independent  Static Standing: Modified Independent    FUNCTIONAL ADL ASSESSMENT  Eating: Independent  Grooming Standing: Supervision (progressing to mod I)  Toileting Seated: Modified Independent    Skilled Therapy Provided: RN approved session. Coordinated with PT, OT focusing on ADLs, functional transfers. Received patient in bed. Vitals:  stable. Performed ADLs/functional mobility/transfers as stated above. At the end of session, patient left in bed per request with needs met, and RN aware of session.    EDUCATION PROVIDED  Patient Education : Role of Occupational Therapy; Plan of Care; Discharge Recommendations; Functional Transfer Techniques; Fall Prevention; Posture/Positioning; Other (ADL training)  Patient's Response to Education: Verbalized Understanding; Returned Demonstration    The patient's Approx Degree of Impairment: 25.8% has been calculated based on documentation in the [facility] '6 clicks' Inpatient Daily Activity Short Form.  Research supports that patients with this level of impairment may benefit from intermittent assist as needed.  Final disposition will be made by interdisciplinary medical team.     Patient End of Session: In bed, Needs met, Call light within reach, RN aware of session/findings, All patient questions and concerns addressed    OT Goals  Patients self stated goal is: go home     Patient will complete functional transfer with mod I  Comment:     Patient will complete toileting with mod I  Comment:     Patient will tolerate standing for 10 minutes in prep for adls with mod I   Comment:    Patient will complete LB dressing with mod I and AE as needed  Comment:          Goals  on: 2025  Frequency: 1-2 more sessions    Patient Evaluation Complexity Level:   Occupational Profile/Medical History LOW - Brief history including review of medical or therapy records    Specific performance deficits impacting engagement in ADL/IADL LOW  1 - 3 performance deficits    Client Assessment/Performance Deficits MODERATE - Comorbidities and min to mod modifications of tasks    Clinical Decision Making LOW - Analysis of occupational profile, problem-focused assessments, limited treatment options    Overall Complexity LOW       Self-Care Home Management: 5 minutes  Therapeutic Activity: 8 minutes  Evaluation    Bernadine Liriano  OTR/L

## 2025-05-18 NOTE — PLAN OF CARE
Patient had an uneventful shift. Heparin gtt continued. 6/10 surgical pain relieved with ice packs, patient declined pain meds. Diaz care completed.     Problem: Patient Centered Care  Goal: Patient preferences are identified and integrated in the patient's plan of care  Description: Interventions:  - What would you like us to know as we care for you? I live at home with my wife.  - Provide timely, complete, and accurate information to patient/family  - Incorporate patient and family knowledge, values, beliefs, and cultural backgrounds into the planning and delivery of care  - Encourage patient/family to participate in care and decision-making at the level they choose  - Honor patient and family perspectives and choices  Outcome: Progressing     Problem: RESPIRATORY - ADULT  Goal: Achieves optimal ventilation and oxygenation  Description: INTERVENTIONS:  - Assess for changes in respiratory status  - Assess for changes in mentation and behavior  - Position to facilitate oxygenation and minimize respiratory effort  - Oxygen supplementation based on oxygen saturation or ABGs  - Provide Smoking Cessation handout, if applicable  - Encourage broncho-pulmonary hygiene including cough, deep breathe, Incentive Spirometry  - Assess the need for suctioning and perform as needed  - Assess and instruct to report SOB or any respiratory difficulty  - Respiratory Therapy support as indicated  - Manage/alleviate anxiety  - Monitor for signs/symptoms of CO2 retention  Outcome: Progressing     Problem: GENITOURINARY - ADULT  Goal: Absence of urinary retention  Description: INTERVENTIONS:  - Assess patient’s ability to void and empty bladder  - Monitor intake/output and perform bladder scan as needed  - Follow urinary retention protocol/standard of care  - Consider collaborating with pharmacy to review patient's medication profile  - Implement strategies to promote bladder emptying  Outcome: Progressing     Problem: METABOLIC/FLUID  AND ELECTROLYTES - ADULT  Goal: Glucose maintained within prescribed range  Description: INTERVENTIONS:  - Monitor Blood Glucose as ordered  - Assess for signs and symptoms of hyperglycemia and hypoglycemia  - Administer ordered medications to maintain glucose within target range  - Assess barriers to adequate nutritional intake and initiate nutrition consult as needed  - Instruct patient on self management of diabetes  Outcome: Progressing  Goal: Electrolytes maintained within normal limits  Description: INTERVENTIONS:  - Monitor labs and rhythm and assess patient for signs and symptoms of electrolyte imbalances  - Administer electrolyte replacement as ordered  - Monitor response to electrolyte replacements, including rhythm and repeat lab results as appropriate  - Fluid restriction as ordered  - Instruct patient on fluid and nutrition restrictions as appropriate  Outcome: Progressing  Goal: Hemodynamic stability and optimal renal function maintained  Description: INTERVENTIONS:  - Monitor labs and assess for signs and symptoms of volume excess or deficit  - Monitor intake, output and patient weight  - Monitor urine specific gravity, serum osmolarity and serum sodium as indicated or ordered  - Monitor response to interventions for patient's volume status, including labs, urine output, blood pressure (other measures as available)  - Encourage oral intake as appropriate  - Instruct patient on fluid and nutrition restrictions as appropriate  Outcome: Progressing     Problem: MUSCULOSKELETAL - ADULT  Goal: Return mobility to safest level of function  Description: INTERVENTIONS:  - Assess patient stability and activity tolerance for standing, transferring and ambulating w/ or w/o assistive devices  - Assist with transfers and ambulation using safe patient handling equipment as needed  - Ensure adequate protection for wounds/incisions during mobilization  - Obtain PT/OT consults as needed  - Advance activity as  appropriate  - Communicate ordered activity level and limitations with patient/family  Outcome: Progressing  Goal: Maintain proper alignment of affected body part  Description: INTERVENTIONS:  - Support and protect limb and body alignment per provider's orders  - Instruct and reinforce with patient and family use of appropriate assistive device and precautions (e.g. spinal or hip dislocation precautions)  Outcome: Progressing

## 2025-05-19 VITALS
OXYGEN SATURATION: 95 % | WEIGHT: 187.38 LBS | DIASTOLIC BLOOD PRESSURE: 67 MMHG | HEART RATE: 101 BPM | BODY MASS INDEX: 29 KG/M2 | SYSTOLIC BLOOD PRESSURE: 123 MMHG | TEMPERATURE: 98 F | RESPIRATION RATE: 16 BRPM

## 2025-05-19 LAB
ALBUMIN SERPL-MCNC: 4 G/DL (ref 3.2–4.8)
ANION GAP SERPL CALC-SCNC: 8 MMOL/L (ref 0–18)
APTT PPP: 110.8 SECONDS (ref 23–36)
BASOPHILS # BLD AUTO: 0.05 X10(3) UL (ref 0–0.2)
BASOPHILS NFR BLD AUTO: 0.6 %
BUN BLD-MCNC: 20 MG/DL (ref 9–23)
BUN/CREAT SERPL: 17.2 (ref 10–20)
CALCIUM BLD-MCNC: 8.7 MG/DL (ref 8.7–10.4)
CHLORIDE SERPL-SCNC: 102 MMOL/L (ref 98–112)
CO2 SERPL-SCNC: 25 MMOL/L (ref 21–32)
CREAT BLD-MCNC: 1.16 MG/DL (ref 0.7–1.3)
DEPRECATED RDW RBC AUTO: 46 FL (ref 35.1–46.3)
EGFRCR SERPLBLD CKD-EPI 2021: 63 ML/MIN/1.73M2 (ref 60–?)
EOSINOPHIL # BLD AUTO: 0.42 X10(3) UL (ref 0–0.7)
EOSINOPHIL NFR BLD AUTO: 4.7 %
ERYTHROCYTE [DISTWIDTH] IN BLOOD BY AUTOMATED COUNT: 13.2 % (ref 11–15)
GLUCOSE BLD-MCNC: 169 MG/DL (ref 70–99)
GLUCOSE BLDC GLUCOMTR-MCNC: 132 MG/DL (ref 70–99)
GLUCOSE BLDC GLUCOMTR-MCNC: 137 MG/DL (ref 70–99)
HCT VFR BLD AUTO: 40.3 % (ref 39–53)
HGB BLD-MCNC: 13 G/DL (ref 13–17.5)
IMM GRANULOCYTES # BLD AUTO: 0.07 X10(3) UL (ref 0–1)
IMM GRANULOCYTES NFR BLD: 0.8 %
LYMPHOCYTES # BLD AUTO: 1.42 X10(3) UL (ref 1–4)
LYMPHOCYTES NFR BLD AUTO: 16 %
MCH RBC QN AUTO: 30.4 PG (ref 26–34)
MCHC RBC AUTO-ENTMCNC: 32.3 G/DL (ref 31–37)
MCV RBC AUTO: 94.4 FL (ref 80–100)
MONOCYTES # BLD AUTO: 1.19 X10(3) UL (ref 0.1–1)
MONOCYTES NFR BLD AUTO: 13.4 %
NEUTROPHILS # BLD AUTO: 5.72 X10 (3) UL (ref 1.5–7.7)
NEUTROPHILS # BLD AUTO: 5.72 X10(3) UL (ref 1.5–7.7)
NEUTROPHILS NFR BLD AUTO: 64.5 %
OSMOLALITY SERPL CALC.SUM OF ELEC: 287 MOSM/KG (ref 275–295)
PHOSPHATE SERPL-MCNC: 3 MG/DL (ref 2.4–5.1)
PLATELET # BLD AUTO: 350 10(3)UL (ref 150–450)
POTASSIUM SERPL-SCNC: 4.3 MMOL/L (ref 3.5–5.1)
RBC # BLD AUTO: 4.27 X10(6)UL (ref 3.8–5.8)
SODIUM SERPL-SCNC: 135 MMOL/L (ref 136–145)
WBC # BLD AUTO: 8.9 X10(3) UL (ref 4–11)

## 2025-05-19 PROCEDURE — 99239 HOSP IP/OBS DSCHRG MGMT >30: CPT | Performed by: HOSPITALIST

## 2025-05-19 PROCEDURE — 99233 SBSQ HOSP IP/OBS HIGH 50: CPT | Performed by: INTERNAL MEDICINE

## 2025-05-19 RX ORDER — FINASTERIDE 5 MG/1
5 TABLET, FILM COATED ORAL DAILY
Qty: 30 TABLET | Refills: 1 | Status: SHIPPED | OUTPATIENT
Start: 2025-05-20

## 2025-05-19 RX ORDER — PANTOPRAZOLE SODIUM 40 MG/1
40 TABLET, DELAYED RELEASE ORAL
Qty: 30 TABLET | Refills: 0 | Status: SHIPPED | OUTPATIENT
Start: 2025-05-19

## 2025-05-19 RX ORDER — ACETAMINOPHEN 500 MG
500 TABLET ORAL EVERY 4 HOURS PRN
Qty: 30 TABLET | Refills: 0 | Status: SHIPPED | COMMUNITY
Start: 2025-05-19

## 2025-05-19 RX ORDER — CALCIUM CARBONATE 500 MG/1
1000 TABLET, CHEWABLE ORAL 3 TIMES DAILY PRN
Qty: 30 TABLET | Refills: 0 | Status: SHIPPED | COMMUNITY
Start: 2025-05-19

## 2025-05-19 NOTE — PROGRESS NOTES
[facility]  part of [facility]    Progress Note    Fransisco David SrDiane Patient Status:  Inpatient    1945 MRN [id number]   Location [facility] 3W/SW Attending Woo Acuña MD   Hosp Day # 2 PCP Tressa Bowman MD       Subjective:   Fransisco David Sr. is a(n) 80 year old male who I am seeing for SAVANNAH    Resting      Objective:   /67 (BP Location: Right arm)   Pulse 101   Temp 97.7 °F (36.5 °C) (Oral)   Resp 16   Wt 187 lb 6.4 oz (85 kg)   SpO2 95%   BMI 29.35 kg/m²      Intake/Output Summary (Last 24 hours) at 2025 1139  Last data filed at 2025 1120  Gross per 24 hour   Intake 470 ml   Output 2300 ml   Net -1830 ml     Wt Readings from Last 1 Encounters:   25 187 lb 6.4 oz (85 kg)       Exam  Gen: No acute distress, Heent: NC AT, mucous memb clear, neck supple  Pulm: Lungs clear, normal respiratory effort  CV: Heart with regular rate and rhythm, no edema  Abd: Abdomen soft, nontender, nondistended, no organomegaly, bowel sounds present  Skin: no symptoms reported  Psych: alert and oriented    Assessment and Plan:     1 - SAVANNAH/hyperkalemia/urinary retension  Due to obstructive uropathy.  In addition the patient had ramipril and Celebrex noted on his med list which likely contributed as well.     Maintain Diaz catheter.  Urology is following him.      2 - LLE DVT  Okay to use a DOAC once renal function improves     3 -recent left TKA  Supportive care     4 - Dm 2  Accu-Cheks     5 - HTN   Okay to resume ramipril on discharge          Results:     Recent Labs   Lab 25  0824 25  1120 25  0839   RBC 3.94 4.08 4.27   HGB 12.1* 12.9* 13.0   HCT 37.1* 38.7* 40.3   MCV 94.2 94.9 94.4   MCH 30.7 31.6 30.4   MCHC 32.6 33.3 32.3   RDW 13.1 13.2 13.2   NEPRELIM 6.56 5.99 5.72   WBC 9.0 8.8 8.9   .0 297.0 350.0         Recent Labs   Lab 25  1842 25  0904 25  0839   * 118* 169*   BUN 54* 24* 20   CREATSERUM  3.04* 1.51* 1.16   CA 8.8 9.0 8.7    137 135*   K 5.8* 5.5* 4.3    108 102   CO2 22.0 17.0* 25.0          No results found.        TAMY CASTILLO MD  5/19/2025

## 2025-05-19 NOTE — CM/SW NOTE
05/19/25 0900   CM/SW Referral Data   Referral Source Social Work (self-referral)   Reason for Referral Discharge planning   Informant Patient;Spouse/Significant Other;Daughter   Medical Hx   Does patient have an established PCP? Yes  (Tressa Bowman)   Patient Info   Patient's Current Mental Status at Time of Assessment Alert;Oriented   Patient's Home Environment House   Number of Levels in Home 3  (pt currently staying on main level)   Number of Stair in Home 2 up to kitchen   Patient lives with Spouse/Significant other   Patient Status Prior to Admission   Independent with ADLs and Mobility No   Pt. requires assistance with Ambulating   Services in place prior to admission Home Health Care;DME/Supplies at home   Home Health Provider Info [facility] ([facility])  ([facility])   Type of DME/Supplies Straight Cane;Standard Walker   Discharge Needs   Anticipated D/C needs Home health care  (resume OPITH)     SW self referred pt for DC planning after chart review showed PT/OT worked w/ pt yesterday and Anticipated therapy need: Home with Home Healthcare    Per chart, pt had recent total knee w/ Dr. Behery and was arranged w/ OP[facility] via [facility] ([facility]).    SW met w/ pt, pt's wife, and pt's dtr at bedside. Above assessment completed.  Verified pt's home address. Confirmed he lives w/ his wife.    Pt confirmed having 3 levels in the home (not including the basement). Per pt and family, he has been staying on the main level - no steps to enter. Pt has a bedroom and bathroom down there.     Per pt, he has approx 2 steps to go up to the kitchen.    Pt reports using a walker for ambulation at this time.    Pt confirmed he would like to continue w/ OPITH per Dr. Behery's instructions.    SW attempted pt's PT [place] via phone to confirm any needs prior to resuming care. No answer. Brief Vmail left.    POLI also sent message to Dr. Behery to confirm if any documents are needed to resume pt's OPITH when  Dc'd. - pending response.    UPDATE: Received call from pt's PT Bernie mejía/ [facility]. Confirmed they can likely see pt again at home next week prior to transitioning to his Outpatient in Clinic appts. Bernie confirmed she will defer to Dr. Behery and his plan for services.    They can not accept back for OPITH if pt requires HH RN. POLI does not anticipate pt having need for HH RN at this time.    PLAN: Home w/ resume OPITH - pending med clear       SW/JOCY to remain available for support and/or discharge planning.       Grace, MSW, LSW [id number]

## 2025-05-19 NOTE — PROGRESS NOTES
[facility]  [facility] Progress Note    Fransisco David Sr. Patient Status:  Inpatient    1945 MRN [id number]   Location [facility] 3W/SW Attending Woo Acuña MD   Hosp Day # 2 PCP Tressa Bowman MD     Fransisco David Sr. is a(n) 80 year old male admitted with:  Chief Complaint   Patient presents with    Abdomen/Flank Pain       SUBJECTIVE:   Reason for Urology Consult: Urinary Retention, Acute Renal Failure, Renal Cyst    Patient feeling well today. Urine output of 2,750 in last 24 hours. No issues with catheter.    Past Medical History[1]  Past Surgical History[2]   Family History[3]  Allergies[4]   Social History:  Short Social Hx on File[5]     MEDICATIONS:   Current Medications[6]    I/Os:   I/O last 3 completed shifts:  In: 777.2 [P.O.:460; I.V.:317.2]  Out: 5000 [Urine:5000]     EXAM:     Vital 24 Hour Range   Temperature Temp  Min: 97.5 °F (36.4 °C)  Max: 98 °F (36.7 °C)   Pulse Pulse  Min: 81  Max: 114   Respiratory Resp  Min: 18  Max: 20   Non-Invasive  Blood Pressure BP  Min: 116/71  Max: 146/72   Pulse Oximetry SpO2  Min: 94 %  Max: 97 %     /67 (BP Location: Right arm)   Pulse 101   Temp 97.7 °F (36.5 °C) (Oral)   Resp 16   Wt 187 lb 6.4 oz (85 kg)   SpO2 95%   BMI 29.35 kg/m²   Body mass index is 29.35 kg/m².    GENERAL: A&O x3, in no apparent distress  LUNGS: no audible wheezing  Abd: Soft, non-tender, non-distended  : mello draining clear and yellow urine.    IMAGING:     US KIDNEY/BLADDER (CPT=76770)   Final Result   PROCEDURE: US KIDNEY/BLADDER (CPT=76770)       COMPARISON: None.       INDICATIONS: SAVANNAH       TECHNIQUE: Ultrasound examination was performed to visualize the kidneys    and bladder.         FINDINGS:    RIGHT KIDNEY: 3.8 and 4.4 cm simple upper pole renal cyst..  Right kidney    length is 12.6 cm.  Normal echotexture.  No hydronephrosis, mass, calculus    or perirenal fluid.     LEFT KIDNEY: Normal.  Left kidney length is 0.2  cm.  Normal echotexture.     No hydronephrosis, mass, calculus or perirenal fluid.     BLADDER: The bladder is decompressed with a Diaz catheter.       CONCLUSION:        Simple right renal cysts.       No hydronephrosis                   DICTATED BY (CST): MELLY BYRD MD ON 5/18/2025 AT 12:48 PM        FINALIZED BY (CST): MELLY BYRD MD ON 5/18/2025 AT 12:49 PM                           NM LUNG VQ VENT / PERFUSION SCAN  (CPT=78582)   Final Result   PROCEDURE: NM LUNG VQ/PERFUSION SCAN (CPT=78582)       COMPARISON: None.       INDICATIONS: eval for PE       TECHNIQUE: Ventilation/perfusion lung study done with 7 millicuries    Xenon-133 (inhaled), followed by 8.4 millicuries of Technetium-99m MAA    injected into the left antecubital vein.         FINDINGS:    PERFUSION: There are small to moderate-sized perfusion defects at the    lateral aspect of the right upper lung.  There are no other perfusion    defects.       VENTILATION - SINGLE BREATH: Normal homogeneous isotope distribution.     VENTILATION EQUILIBRIUM AND WASHOUT IMAGES: Normal.         V/Q MISMATCH: There is normal ventilation in the region of the perfusion    defects.       Comparison is made with a chest X-ray the perfusion defects correspond to    the areas of pleural plaque seen on radiograph of same date.                   =====   CONCLUSION: Low probability for pulmonary embolus.  Per small to    moderate-sized perfusion defects at the lateral aspect of the right    upper/mid lung correspond to pleural plaques in this region.  There are no    other perfusion defects clearly demonstrated.     There are no ventilatory defects.                 Dictated by (CST): Tho Montoya MD on 5/17/2025 at 11:47 AM        Finalized by (CST): Tho Montoya MD on 5/17/2025 at 11:52 AM               US VENOUS DOPPLER LEG BILAT - DIAG IMG (CPT=93970)   Final Result   PROCEDURE: US VENOUS DOPPLER LEG BILAT-DIAG IMG (CPT=93970)       COMPARISON: None.        INDICATIONS: eval for DVT       TECHNIQUE: Color duplex Doppler venous ultrasound of both lower    extremities was performed in the    usual manner.       FINDINGS:   There is acute appearing expansile occlusive or near occlusive    DVT within the distal left popliteal vein and 1 of 2 left peroneal veins.     There is no extension into the mid/proximal popliteal vein.  The remaining    deep veins of the left lower    extremity demonstrate normal blood flow, compressibility, and    augmentation.       There is normal blood flow, compressibility, and augmentation of the deep    veins of the right lower extremity.                   =====   CONCLUSION:        1. Acute appearing expansile near occlusive DVT within the distal left    popliteal vein and 1 of 2 left peroneal veins.  No other DVT is identified    within the left lower extremity.       2. No sonographic evidence of right lower extremity DVT.               Dictated by (CST): Tho Montoya MD on 5/17/2025 at 9:58 AM        Finalized by (CST): Tho Montoya MD on 5/17/2025 at 10:00 AM               XR CHEST AP PORTABLE  (CPT=71045)   Final Result   PROCEDURE: XR CHEST AP PORTABLE  (CPT=71045)   TIME: 841.         COMPARISON: [facility], XR CHEST PA + LAT CHEST    (CPT=71046), 4/29/2025, 10:14 AM.       INDICATIONS: Shortness of breath and abdominal pain.       TECHNIQUE:   Single view.         FINDINGS:    CARDIAC/VASC: Normal.  No cardiac silhouette abnormality or cardiomegaly.     Unremarkable pulmonary vasculature.    MEDIAST/LATASHA:   No visible mass or adenopathy.   LUNGS/PLEURA: There is redemonstration of calcified pleural plaques in the    lateral and superior aspect of the right chest wall.  There is minimal    atelectasis at the left lung base.  The lungs are otherwise clear.   BONES: Moderate degenerative spurring throughout the thoracic spine.   OTHER: Negative.                     =====   CONCLUSION: No acute  cardiopulmonary process clearly visualized.               Dictated by (CST): Tho Montoya MD on 5/17/2025 at 8:53 AM        Finalized by (CST): Tho Montoya MD on 5/17/2025 at 8:56 AM                 LABS:   CBC  Recent Labs   Lab 05/17/25  0824 05/18/25  1120   RBC 3.94 4.08   HGB 12.1* 12.9*   HCT 37.1* 38.7*   MCV 94.2 94.9   MCH 30.7 31.6   MCHC 32.6 33.3   RDW 13.1 13.2   NEPRELIM 6.56 5.99   WBC 9.0 8.8   .0 297.0        BMP  Recent Labs   Lab 05/17/25  0915 05/17/25  1842 05/18/25  0904   * 106* 118*   BUN 80* 54* 24*   CREATSERUM 5.90* 3.04* 1.51*   EGFRCR 9* 20* 46*   CA 8.8 8.8 9.0   * 138 137   K 6.1* 5.8* 5.5*    107 108   CO2 19.0* 22.0 17.0*        Urinalysis/Cultures  Recent Labs   Lab 05/17/25  0850   COLORUR Colorless*   CLARITY Clear   SPECGRAVITY 1.008   GLUUR Normal   BILUR Negative   KETUR Negative   BLOODURINE 2+*   PHURINE 5.5   PROUR Negative   UROBILINOGEN Normal   NITRITE Negative   LEUUR 75*   WBCUR 1-5   RBCUR >10*   BACUR None Seen   EPIUR Few*        No results found for the last 90 days.      I, Olena Giang PA-C, have independently reviewed the patient's labs.  ASSESSMENT AND PLAN:   Impression:  Problem List[7]    Fransisco David . is a(n) 80 year old male with:    Urinary Retention  Acute Renal Failure  BPH  Renal Cyst     - Mello intact draining clear and yellow urine.  - Renal function improving with catheter drainage. Scr 1.16 today.  - RBUS is negative for any hydronephrosis, mass or calculus. Bladder is decompressed with the mello catheter.   - Continue flomax and finasteride.   - No acute urologic intervention indicated at this time. Discussed mello options with patient. He would like to maintain mello at discharge until his follow up appointment with his established urologist at [facility].    Discussed with Dr. Mckay.     Olena Giang PA-C  [facility]  O:  [phone]  5/18/2025  10:58 AM         [1]   Past Medical  History:   Back problem    CAD (coronary artery disease)    Cancer (HCC)    skin    Cataract    Diabetes (HCC)    Diabetes type 2 (HCC)    High blood pressure    High cholesterol    History of colon polyps    HTN (hypertension)    Hyperlipidemia    Osteoarthritis    Visual impairment    reading glasses   [2]   Past Surgical History:  Procedure Laterality Date    Anesth,lower arm surgery Right     wrist surgery    Cataract Bilateral     Colonoscopy      Hip replacement surgery Right 2007    Skin surgery      skin cancer removed from face and chest    Spine surgery procedure unlisted      Total knee replacement Right 2014   [3]   Family History  Problem Relation Age of Onset    Other (Other) Father         cirrohsis    Cancer Mother         liver    Other (Other) Brother         MI   [4]   Allergies  Allergen Reactions    Neomycin-Bacitracin-Polymyxin RASH     With regular use   [5]   Social History  Socioeconomic History    Marital status:    Tobacco Use    Smoking status: Former     Types: Cigarettes     Start date:      Quit date:      Years since quittin.4     Passive exposure: Past    Smokeless tobacco: Never   Vaping Use    Vaping status: Never Used   Substance and Sexual Activity    Alcohol use: Yes     Comment: socially    Drug use: Never     Social Drivers of Health     Food Insecurity: No Food Insecurity (2025)    NCSS - Food Insecurity     Worried About Running Out of Food in the Last Year: No     Ran Out of Food in the Last Year: No   Transportation Needs: No Transportation Needs (2025)    NCSS - Transportation     Lack of Transportation: No   Housing Stability: Not At Risk (2025)    NCSS - Housing/Utilities     Has Housing: Yes     Worried About Losing Housing: No     Unable to Get Utilities: No   [6]   No current outpatient medications on file.   [7]   Patient Active Problem List  Diagnosis    Primary osteoarthritis of left knee    Essential hypertension     Hyperlipidemia    Diabetes type 2 (HCC)    BPH (benign prostatic hyperplasia)    Acute renal insufficiency    Hyperkalemia    Urinary retention    Acute deep vein thrombosis (DVT) of popliteal vein of right lower extremity (HCC)

## 2025-05-19 NOTE — PLAN OF CARE
Problem: Patient Centered Care  Goal: Patient preferences are identified and integrated in the patient's plan of care  Description: Interventions:  - What would you like us to know as we care for you?   - Provide timely, complete, and accurate information to patient/family  - Incorporate patient and family knowledge, values, beliefs, and cultural backgrounds into the planning and delivery of care  - Encourage patient/family to participate in care and decision-making at the level they choose  - Honor patient and family perspectives and choices  Outcome: Progressing     Problem: CARDIOVASCULAR - ADULT  Goal: Maintains optimal cardiac output and hemodynamic stability  Description: INTERVENTIONS:  - Monitor vital signs, rhythm, and trends  - Monitor for bleeding, hypotension and signs of decreased cardiac output  - Evaluate effectiveness of vasoactive medications to optimize hemodynamic stability  - Monitor arterial and/or venous puncture sites for bleeding and/or hematoma  - Assess quality of pulses, skin color and temperature  - Assess for signs of decreased coronary artery perfusion - ex. Angina  - Evaluate fluid balance, assess for edema, trend weights  Outcome: Progressing  Goal: Absence of cardiac arrhythmias or at baseline  Description: INTERVENTIONS:  - Continuous cardiac monitoring, monitor vital signs, obtain 12 lead EKG if indicated  - Evaluate effectiveness of antiarrhythmic and heart rate control medications as ordered  - Initiate emergency measures for life threatening arrhythmias  - Monitor electrolytes and administer replacement therapy as ordered  Outcome: Progressing     Problem: RESPIRATORY - ADULT  Goal: Achieves optimal ventilation and oxygenation  Description: INTERVENTIONS:  - Assess for changes in respiratory status  - Assess for changes in mentation and behavior  - Position to facilitate oxygenation and minimize respiratory effort  - Oxygen supplementation based on oxygen saturation or ABGs  -  Provide Smoking Cessation handout, if applicable  - Encourage broncho-pulmonary hygiene including cough, deep breathe, Incentive Spirometry  - Assess the need for suctioning and perform as needed  - Assess and instruct to report SOB or any respiratory difficulty  - Respiratory Therapy support as indicated  - Manage/alleviate anxiety  - Monitor for signs/symptoms of CO2 retention  Outcome: Progressing     Problem: GASTROINTESTINAL - ADULT  Goal: Minimal or absence of nausea and vomiting  Description: INTERVENTIONS:  - Maintain adequate hydration with IV or PO as ordered and tolerated  - Nasogastric tube to low intermittent suction as ordered  - Evaluate effectiveness of ordered antiemetic medications  - Provide nonpharmacologic comfort measures as appropriate  - Advance diet as tolerated, if ordered  - Obtain nutritional consult as needed  - Evaluate fluid balance  Outcome: Progressing     Problem: GENITOURINARY - ADULT  Goal: Absence of urinary retention  Description: INTERVENTIONS:  - Assess patient’s ability to void and empty bladder  - Monitor intake/output and perform bladder scan as needed  - Follow urinary retention protocol/standard of care  - Consider collaborating with pharmacy to review patient's medication profile  - Implement strategies to promote bladder emptying  Outcome: Progressing     Problem: METABOLIC/FLUID AND ELECTROLYTES - ADULT  Goal: Glucose maintained within prescribed range  Description: INTERVENTIONS:  - Monitor Blood Glucose as ordered  - Assess for signs and symptoms of hyperglycemia and hypoglycemia  - Administer ordered medications to maintain glucose within target range  - Assess barriers to adequate nutritional intake and initiate nutrition consult as needed  - Instruct patient on self management of diabetes  Outcome: Progressing  Goal: Electrolytes maintained within normal limits  Description: INTERVENTIONS:  - Monitor labs and rhythm and assess patient for signs and symptoms of  electrolyte imbalances  - Administer electrolyte replacement as ordered  - Monitor response to electrolyte replacements, including rhythm and repeat lab results as appropriate  - Fluid restriction as ordered  - Instruct patient on fluid and nutrition restrictions as appropriate  Outcome: Progressing  Goal: Hemodynamic stability and optimal renal function maintained  Description: INTERVENTIONS:  - Monitor labs and assess for signs and symptoms of volume excess or deficit  - Monitor intake, output and patient weight  - Monitor urine specific gravity, serum osmolarity and serum sodium as indicated or ordered  - Monitor response to interventions for patient's volume status, including labs, urine output, blood pressure (other measures as available)  - Encourage oral intake as appropriate  - Instruct patient on fluid and nutrition restrictions as appropriate  Outcome: Progressing     Problem: MUSCULOSKELETAL - ADULT  Goal: Return mobility to safest level of function  Description: INTERVENTIONS:  - Assess patient stability and activity tolerance for standing, transferring and ambulating w/ or w/o assistive devices  - Assist with transfers and ambulation using safe patient handling equipment as needed  - Ensure adequate protection for wounds/incisions during mobilization  - Obtain PT/OT consults as needed  - Advance activity as appropriate  - Communicate ordered activity level and limitations with patient/family  Outcome: Progressing  Goal: Maintain proper alignment of affected body part  Description: INTERVENTIONS:  - Support and protect limb and body alignment per provider's orders  - Instruct and reinforce with patient and family use of appropriate assistive device and precautions (e.g. spinal or hip dislocation precautions)  Outcome: Progressing

## 2025-05-19 NOTE — PLAN OF CARE
Problem: Patient Centered Care  Goal: Patient preferences are identified and integrated in the patient's plan of care  Description: Interventions:  - Provide timely, complete, and accurate information to patient/family  - Incorporate patient and family knowledge, values, beliefs, and cultural backgrounds into the planning and delivery of care  - Encourage patient/family to participate in care and decision-making at the level they choose  - Honor patient and family perspectives and choices  Outcome: Adequate for Discharge     Problem: CARDIOVASCULAR - ADULT  Goal: Maintains optimal cardiac output and hemodynamic stability  Description: INTERVENTIONS:  - Monitor vital signs, rhythm, and trends  - Monitor for bleeding, hypotension and signs of decreased cardiac output  - Evaluate effectiveness of vasoactive medications to optimize hemodynamic stability  - Monitor arterial and/or venous puncture sites for bleeding and/or hematoma  - Assess quality of pulses, skin color and temperature  - Assess for signs of decreased coronary artery perfusion - ex. Angina  - Evaluate fluid balance, assess for edema, trend weights  Outcome: Adequate for Discharge  Goal: Absence of cardiac arrhythmias or at baseline  Description: INTERVENTIONS:  - Continuous cardiac monitoring, monitor vital signs, obtain 12 lead EKG if indicated  - Evaluate effectiveness of antiarrhythmic and heart rate control medications as ordered  - Initiate emergency measures for life threatening arrhythmias  - Monitor electrolytes and administer replacement therapy as ordered  Outcome: Adequate for Discharge     Problem: RESPIRATORY - ADULT  Goal: Achieves optimal ventilation and oxygenation  Description: INTERVENTIONS:  - Assess for changes in respiratory status  - Assess for changes in mentation and behavior  - Position to facilitate oxygenation and minimize respiratory effort  - Oxygen supplementation based on oxygen saturation or ABGs  - Provide Smoking  Cessation handout, if applicable  - Encourage broncho-pulmonary hygiene including cough, deep breathe, Incentive Spirometry  - Assess the need for suctioning and perform as needed  - Assess and instruct to report SOB or any respiratory difficulty  - Respiratory Therapy support as indicated  - Manage/alleviate anxiety  - Monitor for signs/symptoms of CO2 retention  Outcome: Adequate for Discharge     Problem: GASTROINTESTINAL - ADULT  Goal: Minimal or absence of nausea and vomiting  Description: INTERVENTIONS:  - Maintain adequate hydration with IV or PO as ordered and tolerated  - Nasogastric tube to low intermittent suction as ordered  - Evaluate effectiveness of ordered antiemetic medications  - Provide nonpharmacologic comfort measures as appropriate  - Advance diet as tolerated, if ordered  - Obtain nutritional consult as needed  - Evaluate fluid balance  Outcome: Adequate for Discharge     Problem: GENITOURINARY - ADULT  Goal: Absence of urinary retention  Description: INTERVENTIONS:  - Assess patient’s ability to void and empty bladder  - Monitor intake/output and perform bladder scan as needed  - Follow urinary retention protocol/standard of care  - Consider collaborating with pharmacy to review patient's medication profile  - Implement strategies to promote bladder emptying  Outcome: Adequate for Discharge     Problem: METABOLIC/FLUID AND ELECTROLYTES - ADULT  Goal: Glucose maintained within prescribed range  Description: INTERVENTIONS:  - Monitor Blood Glucose as ordered  - Assess for signs and symptoms of hyperglycemia and hypoglycemia  - Administer ordered medications to maintain glucose within target range  - Assess barriers to adequate nutritional intake and initiate nutrition consult as needed  - Instruct patient on self management of diabetes  Outcome: Adequate for Discharge  Goal: Electrolytes maintained within normal limits  Description: INTERVENTIONS:  - Monitor labs and rhythm and assess patient  for signs and symptoms of electrolyte imbalances  - Administer electrolyte replacement as ordered  - Monitor response to electrolyte replacements, including rhythm and repeat lab results as appropriate  - Fluid restriction as ordered  - Instruct patient on fluid and nutrition restrictions as appropriate  Outcome: Adequate for Discharge  Goal: Hemodynamic stability and optimal renal function maintained  Description: INTERVENTIONS:  - Monitor labs and assess for signs and symptoms of volume excess or deficit  - Monitor intake, output and patient weight  - Monitor urine specific gravity, serum osmolarity and serum sodium as indicated or ordered  - Monitor response to interventions for patient's volume status, including labs, urine output, blood pressure (other measures as available)  - Encourage oral intake as appropriate  - Instruct patient on fluid and nutrition restrictions as appropriate  Outcome: Adequate for Discharge     Problem: MUSCULOSKELETAL - ADULT  Goal: Return mobility to safest level of function  Description: INTERVENTIONS:  - Assess patient stability and activity tolerance for standing, transferring and ambulating w/ or w/o assistive devices  - Assist with transfers and ambulation using safe patient handling equipment as needed  - Ensure adequate protection for wounds/incisions during mobilization  - Obtain PT/OT consults as needed  - Advance activity as appropriate  - Communicate ordered activity level and limitations with patient/family  Outcome: Adequate for Discharge  Goal: Maintain proper alignment of affected body part  Description: INTERVENTIONS:  - Support and protect limb and body alignment per provider's orders  - Instruct and reinforce with patient and family use of appropriate assistive device and precautions (e.g. spinal or hip dislocation precautions)  Outcome: Adequate for Discharge

## 2025-05-19 NOTE — DISCHARGE PLANNING
Patient was provided with discharge instructions, education, and follow up information. Patient's wife and daughter present for discharge instructions with patient's consent. Prescriptions were already sent electronically to patient's pharmacy. Patient verbalizes understanding of follow up information, specifically following up, medication changes and when to take the next dose, urinary retention, mello care, bleeding precautions, signs and symptoms of when to call MD or EMS. Patient has no questions after reviewing all instructions and will be going home with his family.     Clei DREW, Discharge Leader [id number]

## 2025-05-19 NOTE — CM/SW NOTE
05/19/25 1100   Discharge disposition   Expected discharge disposition Home-Health   Post Acute Care Provider   ([facility] via [facility])   Discharge transportation Private car     Per chart, pt has DC order for today.    SW left Vmail for pt's PT [place] w/ [facility].    Pt is cleared from SW/CM stand point. RN and DC RN updated.      PLAN: Home w/ [facility] via [facility]          LIZBETH Edwards, LSW [id number]

## 2025-05-19 NOTE — PROGRESS NOTES
Confirmed with patient's pharmacy, [facility] in [place] on [address], pt's out of pocket cost for Eliquis would be $50 with care more.    Nava DALAL RN

## 2025-05-19 NOTE — DISCHARGE SUMMARY
[facility]  Discharge Summary     Fransisco David Sr.  : 1945    Status: Inpatient  Day #: 2    Attending: Woo Acuña MD  PCP: Tressa Bowman MD     Date of Admission:  2025  Date of Discharge:  2025     Hospital Discharge Diagnoses:     SAVANNAH 2/2 acute urinary retention  BPH  Metabolic acidosis  Hyperkalemima  Hyponatremia  Acute LLE DVT  Recent left TKA  DM2  PAD  HL  GERD      History of Present Illness:     Copied from Admission H&P:    Fransisco David Sr. is a(n) 80 year old male with a h/o PAD, DM2, BPH, HTN, HL who just had left total knee arthroplasty on 25. He was on asa postop for DVT ppx. Postoperative course was uneventful and the patient was discharged on 5/10/25. His left knee function and pain have improved, but he presents to ED with difficulty urinating, abdominal pain, and heartburn with SOB. He was found to have cr 5.9, K 6.1 and upon catheterization 2L of urine came out. He's feeling much better now. He was found to have a LLE DVT as well and started on heparin IV.        Hospital Course:     SAVANNAH 2/2 urinary retention  H/o BPH  Metabolic acidosis  -nephrology on consult  -urology on consult  -catheter placed  -monitor UOP - large  -cont flomax. Finasteride started.  -SAVANNAH resolved  -to be discharged with mello per urology and f/u outpatient for void trial     Hyperkalemia due to SAVANNAH  -treated  -labs pending     Hyponatremia  -monitor     LLE DVT related to recent surgery  -he was appropriately taking asa BID post-op  -cont heparin drip -> eliquis   -VQ scan low probability for PE     Recent left TKA  -pain control  -PT/OT  -ortho on consult     DM2  -cont home meds     H/o PAD  HL  -cont statin     GERD  -PPI  -tums prn     Consultants         Provider   Role Specialty     Seymour Knapp DO      Consulting Physician UROLOGY     Behery, Omar Atef, MD      Consulting Physician Surgery, Orthopaedic     Emmie Cancino MD      Consulting Physician  NEPHROLOGY             Physical Exam:   Blood pressure 123/67, pulse 101, temperature 97.7 °F (36.5 °C), temperature source Oral, resp. rate 16, weight 187 lb 6.4 oz (85 kg), SpO2 95%.  General:  Alert, no distress  HEENT:  Normocephalic, atraumatic  Cardiac:  Regular rate, regular rhythm  Pulmonary:  Clear to auscultation bilaterally, respirations unlabored  Gastrointestinal:  Soft, non-tender, normal bowel sounds  Musculoskeletal:  No joint swelling  Extremities:  No edema, no cyanosis, no clubbing  Neurologic:  nonfocal  Psychiatric:  Normal affect, calm and appropriate         Discharge Medications        START taking these medications        Instructions Prescription details   acetaminophen 500 MG Tabs  Commonly known as: Tylenol Extra Strength      Take 1 tablet (500 mg total) by mouth every 4 (four) hours as needed for Pain.   Quantity: 30 tablet  Refills: 0     apixaban 5 MG Tabs  Commonly known as: Eliquis  Start taking on: May 19, 2025      Take 2 tablets (10 mg total) by mouth 2 (two) times daily for 7 days, THEN 1 tablet (5 mg total) 2 (two) times daily for 23 days.   Stop taking on: June 18, 2025  Quantity: 74 tablet  Refills: 0     calcium carbonate 500 MG Chew  Commonly known as: Tums      Chew 2 tablets (1,000 mg total) by mouth 3 (three) times daily as needed.   Quantity: 30 tablet  Refills: 0     finasteride 5 MG Tabs  Commonly known as: Proscar  Start taking on: May 20, 2025      Take 1 tablet (5 mg total) by mouth daily.   Quantity: 30 tablet  Refills: 1     pantoprazole 40 MG Tbec  Commonly known as: Protonix      Take 1 tablet (40 mg total) by mouth every morning before breakfast.   Quantity: 30 tablet  Refills: 0            CONTINUE taking these medications        Instructions Prescription details   atorvastatin 20 MG Tabs  Commonly known as: Lipitor      Take 1 tablet (20 mg total) by mouth in the morning.   Refills: 0     metFORMIN 500 MG Tabs  Commonly known as: Glucophage      Take 1  tablet (500 mg total) by mouth 2 (two) times daily with meals.   Refills: 0     One-Daily Multi Vitamins Tabs      Take 1 tablet by mouth daily.   Refills: 0     polyethylene glycol (PEG 3350) 17 g Pack  Commonly known as: Miralax      Take 17 g by mouth in the morning.   Refills: 0     ramipril 10 MG Caps  Commonly known as: Altace      Take 1 capsule (10 mg total) by mouth in the evening.   Refills: 0     tamsulosin 0.4 MG Caps  Commonly known as: Flomax      Take 1 capsule (0.4 mg total) by mouth at bedtime.   Refills: 0     VITAMIN D3 OR      Take 1,000 Units by mouth daily.   Refills: 0            STOP taking these medications      aspirin 81 MG Tbec                  Where to Get Your Medications        These medications were sent to [facility]/pharmacy #[id number] - [place], [place] - [address]  AT [facility] ([address], [phone], [phone]  [address] , [facility] [address]      Phone: [phone]   apixaban 5 MG Tabs  finasteride 5 MG Tabs  pantoprazole 40 MG Tbec       Information about where to get these medications is not yet available    Ask your nurse or doctor about these medications  acetaminophen 500 MG Tabs  calcium carbonate 500 MG Chew        Follow-up Information       Tressa Bowman MD Follow up.    Specialty: Internal Medicine  Contact information:  [address] [facility] [address]  [facility] [address] [phone]               Emmie Cancino MD Follow up.    Specialty: NEPHROLOGY  Contact information:  [address] [facility] [address]  [facility] [address] [phone]               Urology Follow up.    Why: Follow up with your urologist as scheduled                           Hospital Discharge Diagnoses: SAVANNAH 2/2 urinary retention    Lace+ Score: 58  59-90 High Risk  29-58 Medium Risk  0-28   Low Risk.    TCM Follow-Up Recommendation:  LACE 29-58: Moderate Risk of readmission after discharge from the hospital.        I spent >30 minutes on this discharge. Discussed treatment and  discharge plans.       Woo Acuña MD

## (undated) DEVICE — 2T11 #2 PDO 36 X 36: Brand: 2T11 #2 PDO 36 X 36

## (undated) DEVICE — ZZ-CONVERTED-TO-524825-ADHESIVE SKIN TOP FOR WND CLSR DERMBND ADV

## (undated) DEVICE — PACK,UNIVERSAL,SPLIT,II: Brand: MEDLINE

## (undated) DEVICE — 3M™ COBAN™ NL STERILE NON-LATEX SELF-ADHERENT WRAP, 2084S, 4 IN X 5 YD (10 CM X 4,5 M), 18 ROLLS/CASE: Brand: 3M™ COBAN™

## (undated) DEVICE — STRYKER PERFORMANCE SERIES SAGITTAL BLADE: Brand: STRYKER PERFORMANCE SERIES

## (undated) DEVICE — GAMMEX® PI HYBRID SIZE 8.5, STERILE POWDER-FREE SURGICAL GLOVE, POLYISOPRENE AND NEOPRENE BLEND: Brand: GAMMEX

## (undated) DEVICE — SKIN PREP TRAY 4 COMPARTM TRAY: Brand: MEDLINE INDUSTRIES, INC.

## (undated) DEVICE — EMPOWR 3D KNEETM FEMUR, NP, 7L
Type: IMPLANTABLE DEVICE | Site: KNEE | Status: NON-FUNCTIONAL
Brand: DJO SURGICAL

## (undated) DEVICE — APPLICATOR PREP 26ML CHG 2% ISO ALC 70%

## (undated) DEVICE — SUT MCRYL 2-0 36IN CT-1 ABSRB UD L36MM TAPR P

## (undated) DEVICE — DRESSING SUR 9X25CM SIL SP CVR WTRPRF VIR

## (undated) DEVICE — NEEDLE SPNL 18GA L3.5IN W/ QNCKE SHARPER BVL

## (undated) DEVICE — HOOD: Brand: FLYTE

## (undated) DEVICE — SOLUTION IRRIG 1000ML 0.9% NACL USP BTL

## (undated) DEVICE — 3 BONE CEMENT MIXER: Brand: MIXEVAC

## (undated) DEVICE — SUT COAT VCRL 0 27IN CT-1 ABSRB VLT 36MM 1/2

## (undated) DEVICE — PACK CDS TOTAL KNEE

## (undated) DEVICE — DISPOSABLE TOURNIQUET CUFF SINGLE BLADDER, DUAL PORT AND QUICK CONNECT CONNECTOR: Brand: COLOR CUFF

## (undated) DEVICE — HANDPIECE SET WITH HIGH FLOW TIP AND SUCTION TUBE: Brand: INTERPULSE

## (undated) DEVICE — SUT MCRYL 3-0 27IN ABSRB UD L24MM PS-1

## (undated) DEVICE — GAMMEX® NON-LATEX PI ORTHO SIZE 8.5, STERILE POLYISOPRENE POWDER-FREE SURGICAL GLOVE: Brand: GAMMEX

## (undated) DEVICE — Device: Brand: STABLECUT®

## (undated) DEVICE — SUT COAT VCRL+ 1 18IN CTX ABSRB VLT ANTIBACT

## (undated) DEVICE — NEPTUNE E-SEP SMOKE EVACUATION PENCIL, COATED, 70MM BLADE, PUSH BUTTON SWITCH: Brand: NEPTUNE E-SEP

## (undated) DEVICE — T5 HOOD WITH PEEL AWAY FACE SHIELD

## (undated) DEVICE — SOLUTION IRRIG 3000ML 0.9% NACL FLX CONT

## (undated) DEVICE — DRAPE,U/ SHT,SPLIT,PLAS,STERIL: Brand: MEDLINE

## (undated) DEVICE — BLADE SAW 1.14X2.17IN THK0.025IN CUT

## (undated) DEVICE — SYRINGE MED 20ML STD CLR PLAS LL TIP N CTRL

## (undated) DEVICE — GAMMEX® PI HYBRID SIZE 8, STERILE POWDER-FREE SURGICAL GLOVE, POLYISOPRENE AND NEOPRENE BLEND: Brand: GAMMEX

## (undated) DEVICE — SHEET,DRAPE,53X77,STERILE: Brand: MEDLINE